# Patient Record
Sex: MALE | Race: WHITE | Employment: OTHER | URBAN - METROPOLITAN AREA
[De-identification: names, ages, dates, MRNs, and addresses within clinical notes are randomized per-mention and may not be internally consistent; named-entity substitution may affect disease eponyms.]

---

## 2018-09-12 ENCOUNTER — OFFICE VISIT (OUTPATIENT)
Dept: ORTHOPEDIC SURGERY | Age: 68
End: 2018-09-12

## 2018-09-12 VITALS — HEIGHT: 70 IN | WEIGHT: 201 LBS | BODY MASS INDEX: 28.77 KG/M2

## 2018-09-12 DIAGNOSIS — M70.61 TROCHANTERIC BURSITIS OF RIGHT HIP: ICD-10-CM

## 2018-09-12 DIAGNOSIS — M25.551 RIGHT HIP PAIN: Primary | ICD-10-CM

## 2018-09-12 DIAGNOSIS — M25.561 RIGHT KNEE PAIN, UNSPECIFIED CHRONICITY: ICD-10-CM

## 2018-09-12 DIAGNOSIS — M76.31 ILIOTIBIAL BAND SYNDROME OF RIGHT SIDE: ICD-10-CM

## 2018-09-12 DIAGNOSIS — M22.2X1 PATELLOFEMORAL PAIN SYNDROME OF RIGHT KNEE: ICD-10-CM

## 2018-09-12 PROCEDURE — 99214 OFFICE O/P EST MOD 30 MIN: CPT | Performed by: ORTHOPAEDIC SURGERY

## 2018-09-12 RX ORDER — CYCLOBENZAPRINE HCL 5 MG
5 TABLET ORAL NIGHTLY PRN
Qty: 30 TABLET | Refills: 0 | Status: ON HOLD | OUTPATIENT
Start: 2018-09-12 | End: 2020-10-13 | Stop reason: ALTCHOICE

## 2018-09-12 RX ORDER — NEBIVOLOL 5 MG/1
TABLET ORAL
COMMUNITY
Start: 2018-05-23

## 2018-09-12 RX ORDER — TRAZODONE HYDROCHLORIDE 50 MG/1
TABLET ORAL
COMMUNITY
Start: 2018-04-20

## 2018-09-12 NOTE — PROGRESS NOTES
CHIEF COMPLAINT:    Chief Complaint   Patient presents with    Hip Pain     RIGHT HIP PAIN. [address] BACK IN 2010. PAIN STARTED ABOUT 1 MONTH AGO, REALLY INCREASED OVER THE PAST 2 WEEKS. CAN'T SLEEP AT NIGHT NO INJURY    Knee Pain     RIGHT KNEE PAIN. CAN'T TAKE NSAIDS       HISTORY OF PRESENT ILLNESS:                The patient is a 79 y.o. male who presents to clinic for evaluation of right hip and knee pain. He has a history of a [place] hip resurfacing performed in 2010. He did extremely well postoperatively however, over the past couple of weeks he has noticed increased pain in the lateral aspect of the hip. He states his pain travels down the lateral thigh and into the knee. He reports some pain along the anterior aspect of the knee as well. He works as a . He does admit to some catching along the lateral aspect of the hip which is intermittent. He also admits to some sleep disturbances.     Past Medical History:   Diagnosis Date    Colon polyps     Hyperlipidemia     Hypertension     Sarcoidosis           The pain assessment was noted & is as follows:  Pain Assessment  Location of Pain: Pelvis  Location Modifiers: Right  Severity of Pain: 8  Quality of Pain: Aching, Dull, Throbbing  Duration of Pain: Persistent  Frequency of Pain: Intermittent  Aggravating Factors: Stretching, Bending, Walking, Other (Comment) (SLEEPING)  Limiting Behavior: Some  Relieving Factors: Rest  Result of Injury: No  Work-Related Injury: No  Are there other pain locations you wish to document?: No]      Work Status/Functionality:     Past Medical History: Medical history form was reviewed today & can be found in the media tab  Past Medical History:   Diagnosis Date    Colon polyps     Hyperlipidemia     Hypertension     Sarcoidosis       Past Surgical History:     Past Surgical History:   Procedure Laterality Date    BRONCHOSCOPY      COLONOSCOPY  06/12/98    Tunular adenoma & Lymphocytic colitis    COLONOSCOPY  08/04/00    Adenoma    COLONOSCOPY  01/11/02    Hemorrhoids    COLONOSCOPY  01/28/05 12/08/07 11/09/10    No polyps    COLONOSCOPY  10/6/2015    HERNIA REPAIR Right 1990    inguinal     HIP SURGERY Right 12/28/10     [place] HIP RESURFACING W/ CELL SAVER & PLATELET GEL    UPPER GASTROINTESTINAL ENDOSCOPY  01/114/02 12/18/07     Current Medications:     Current Outpatient Prescriptions:     nebivolol (BYSTOLIC) 5 MG tablet, TAKE 1 TABLET BY MOUTH ONE TIME A DAY , Disp: , Rfl:     traZODone (DESYREL) 50 MG tablet, TAKE 1 TABLET BY MOUTH nightly , Disp: , Rfl:     omeprazole (PRILOSEC) 10 MG capsule, Take 10 mg by mouth daily, Disp: , Rfl:     hydrochlorothiazide (HYDRODIURIL) 25 MG tablet, Take 25 mg by mouth daily. , Disp: , Rfl:     lisinopril (PRINIVIL;ZESTRIL) 20 MG tablet, Take 40 mg by mouth daily , Disp: , Rfl:     rosuvastatin (CRESTOR) 5 MG tablet, Take 5 mg by mouth daily. , Disp: , Rfl:   Allergies:  Percocet [oxycodone-acetaminophen]  Social History:    reports that he has never smoked. He has never used smokeless tobacco. He reports that he does not drink alcohol or use drugs. Family History:   Family History   Problem Relation Age of Onset    High Blood Pressure Father     Heart Disease Paternal Aunt         aneursym    Heart Disease Paternal Uncle     Diabetes Paternal Uncle     Heart Disease Paternal Grandmother     Cancer Paternal Uncle 79    Emphysema Brother     Diabetes Maternal Uncle     Asthma Neg Hx     Heart Failure Neg Hx        REVIEW OF SYSTEMS:   For new problems, a full review of systems will be found scanned in the patient's chart. CONSTITUTIONAL: Denies unexplained weight loss, fevers, chills   NEUROLOGICAL: Denies unsteady gait or progressive weakness  SKIN: Denies skin changes, delayed healing, rash, itching       PHYSICAL EXAM:    Vitals: Height 5' 10\" (1.778 m), weight 201 lb (91.2 kg).     GENERAL EXAM:  · General Apparence: Patient is adequately Treatment Plan:     1. Right hip history of [place] hip resurfacing    2. Right hip trochanteric bursitis    3. Right-sided iliotibial band syndrome    4. Right knee patellofemoral syndrome/osteoarthritis      I discussed the patient that I think his primary issue is trochanteric bursitis/iliotibial band tightness. I discussed with him treatment options including cortisone injections, physical therapy, activity modification or possibly muscle relaxers do help with his sleep. Unfortunately, the patient is unable to take nonsteroidal anti-inflammatory medications secondary to gastric ulcers. He was given a cortisone injection to the right greater trochanter today as well as a referral to physical therapy. I discussed in detail the risks, benefits, and complications of an injection which include but are not limited to infection, skin reactions, hot swollen joints, and anaphylaxis with the patient. The patient verbalized good understanding and gave informed consent for the injection. The skin was prepped using sterile alcohol. A sterile 22-gauge needle was inserted into the area of maximal tenderness over the greater trochanter and a mixture of 4 mL of 2% Carbocaine, 4 mL of 0.25% Marcaine, and 80 mg of Depo-Medrol was injected under sterile technique. The needle was withdrawn and the puncture site sealed with a Band-Aid. Technique: Under sterile conditions a Son[employer] ultrasound unit with a variable frequency (6.0-15.0 MHz) linear transducer was used to localize the placement of a 22-gauge needle into the area of maximal tenderness over the right greater trochanter. Findings: Successful needle placement for Hip injection. Final images were taken and saved for permanent record. The patient tolerated the injection well. The patient was instructed to call the office immediately if there is any pain, redness, warmth, fever, or chills.

## 2018-09-24 ENCOUNTER — HOSPITAL ENCOUNTER (OUTPATIENT)
Dept: PHYSICAL THERAPY | Age: 68
Setting detail: THERAPIES SERIES
Discharge: HOME | End: 2018-09-24
Payer: COMMERCIAL

## 2018-09-24 PROCEDURE — 97161 PT EVAL LOW COMPLEX 20 MIN: CPT

## 2018-09-24 PROCEDURE — 97140 MANUAL THERAPY 1/> REGIONS: CPT

## 2018-09-24 PROCEDURE — G8978 MOBILITY CURRENT STATUS: HCPCS

## 2018-09-24 PROCEDURE — 97110 THERAPEUTIC EXERCISES: CPT

## 2018-09-24 PROCEDURE — G0283 ELEC STIM OTHER THAN WOUND: HCPCS

## 2018-09-24 PROCEDURE — G8979 MOBILITY GOAL STATUS: HCPCS

## 2018-09-24 NOTE — PLAN OF CARE
3   Hip ER 4- 4-   Knee EXT (quad) 4+ 4   Knee Flex (HS) 4+ 4+   Ankle DF     Ankle PF     Ankle Inv     Ankle EV          Circumference  Mid apex  7 cm prox     35 cm   35 cm     Reflexes/Sensation:   [x]Dermatomes/Myotomes intact    [x]Reflexes equal and normal bilaterally   []Other:    Joint mobility:    []Normal    [x]Hypo: bilat hips and right knee   []Hyper    Palpation: TTP  Bilat greater trochanters, Distal IT Band insertion right knee, and superior-lateral patella border. Functional Mobility/Transfers: indep    Posture: Forward and mild right lateral shift of trunk. Flattened Lumbar Lordosis. Bandages/Dressings/Incisions:     Gait:No AD. Patient wears right patellar knee sleeve. Slight Trendelenburg. Single Leg Stance: right= 12 sec and left=20 sec. Stairs: ascend reciprocal, descend slow w/ side step or step-to    Orthopedic Special Tests: +Sanju, +Obers, + Joe's on left, Poor gluteal activation bilat. - P/F grind, Good PatellarTracking, - McMurrays, - Lachman's. [x] Patient history, allergies, meds reviewed. Medical chart reviewed. See intake form. Review Of Systems (ROS):  [x]Performed Review of systems (Integumentary, CardioPulmonary, Neurological) by intake and observation. Intake form has been scanned into medical record. Patient has been instructed to contact their primary care physician regarding ROS issues if not already being addressed at this time.       Co-morbidities/Complexities (which will affect course of rehabilitation):   []None           Arthritic conditions   []Rheumatoid arthritis (M05.9)  []Osteoarthritis (M19.91)   Cardiovascular conditions   [x]Hypertension (I10)  [x]Hyperlipidemia (E78.5)  []Angina pectoris (I20)  []Atherosclerosis (I70)   Musculoskeletal conditions   []Disc pathology   []Congenital spine pathologies   [x]Prior surgical intervention  []Osteoporosis (M81.8)  []Osteopenia (M85.8)   Endocrine conditions

## 2018-09-24 NOTE — FLOWSHEET NOTE
related to improving balance, coordination, kinesthetic sense, posture, motor skill, proprioception of core, proximal hip and LE for self care, mobility, lifting, and ambulation/stair navigation      Manual Treatments:  PROM / STM / Oscillations-Mobs:  G-I, II, III, IV (PA's, Inf., Post.)  [] (68814) Provided manual therapy to mobilize LE, proximal hip and/or LS spine soft tissue/joints for the purpose of modulating pain, promoting relaxation,  increasing ROM, reducing/eliminating soft tissue swelling/inflammation/restriction, improving soft tissue extensibility and allowing for proper ROM for normal function with self care, mobility, lifting and ambulation. Modalities:  PM ES/CP to bilateral lateral hip     Charges:  Timed Code Treatment Minutes: 35   Total Treatment Minutes: 70     [x] EVAL (LOW) 64272 (typically 20 minutes face-to-face)  [] EVAL (MOD) 20434 (typically 30 minutes face-to-face)  [] EVAL (HIGH) 01376 (typically 45 minutes face-to-face)  [] RE-EVAL     [x] FH(59933) x  1   [] IONTO  [] NMR (79526) x      [] VASO  [x] Manual (49000) x  1    [x] Other:Cp  [] TA x       [] Mech Traction (75464)  [] ES(attended) (85526)      [x] ES (un) (26056):     GOALS:   Short Term Goals: To be achieved in: 2 weeks  1. Independent in HEP and progression per patient tolerance, in order to prevent re-injury. 2. Patient will have a decrease in pain to facilitate improvement in movement, function, and ADLs as indicated by Functional Deficits. Long Term Goals: To be achieved in: 4-6weeks  1. Disability index score of 30% or less for the LEFS to assist with reaching prior level of function. 2. Patient will demonstrate increased AROM to 0-130 degrees   Right knee and +10 degrees Right Hip Extension to allow for proper joint functioning as indicated by patients Functional Deficits.    3. Patient will demonstrate an increase in Strength to good proximal hip strength and control, within 5lb HHD in LE to allow for

## 2018-09-27 ENCOUNTER — HOSPITAL ENCOUNTER (OUTPATIENT)
Dept: PHYSICAL THERAPY | Age: 68
Setting detail: THERAPIES SERIES
Discharge: HOME | End: 2018-09-27
Payer: COMMERCIAL

## 2018-09-27 PROCEDURE — 97110 THERAPEUTIC EXERCISES: CPT | Performed by: PHYSICAL THERAPIST

## 2018-09-27 PROCEDURE — G0283 ELEC STIM OTHER THAN WOUND: HCPCS | Performed by: PHYSICAL THERAPIST

## 2018-09-27 PROCEDURE — 97140 MANUAL THERAPY 1/> REGIONS: CPT | Performed by: PHYSICAL THERAPIST

## 2018-09-27 NOTE — FLOWSHEET NOTE
[facility], [address] [place] [address] Maciej  Phone: [phone]  Fax [phone]    Physical Therapy Daily Treatment Note  Date:  2018    Patient Name:  Yobany Trivedi    :  1950  MRN: [id number]  Restrictions/Precautions:    Medical/Treatment Diagnosis Information:  · Diagnosis: M25.561 Right Knee Pain,  M76.31 Right IT Band Syndrome, M70.61 Right Hip Trochanteric Bursitis, M22.2X1 Right Knee Patello-femoral Syndrome  · Treatment Diagnosis: M25.561 Right Knee Pain,  M25.551 Right Knee Pain,  M25.561 Right Hip Stiffness,  M62.81 Right LE and Core Weakness, R26.2 Difficulty Walking  Insurance/Certification information:  PT Insurance Information:  [employer] (20PT hard limit, No Auth)  Physician Information:  Referring Practitioner: Dr. Dasilva Shaheed of care signed (Y/N): 2018    Date of Patient follow up with Physician:     G-Code (if applicable):      Date G-Code Applied:         Progress Note: []  Yes  [x]  No  Next due by: Visit #10       Latex Allergy:  [x]NO      []YES  Preferred Language for Healthcare:   [x]English       []other:    Visit # Insurance Allowable Requires auth   2 20 hard limit    [x]no        []yes:       Pain level:   5/10  bilat hips (R>L) & R knee     SUBJECTIVE: pt reports his leg is feeling pretty achy today from outside of hip to knee.  Pt reports compliance with HEP     OBJECTIVE:   Observation:   Test measurements:      RESTRICTIONS/PRECAUTIONS: Right hip cortisone injection 2018  [place] Right Hip Resurfacing ,  Right Hernia Repair     Exercises/Interventions:     Therapeutic Ex Sets/sec Reps Notes   Bike 5 min     Gastroc Stretch on Incline H30x4     Prone quad stretch  10\"x10      Supine Psoas Release w/ Knee flexion Stretch H15x5B  hep   Bridge w/ Hip Abd Blue TB H5 2x10 With AZ  hep   Sidestepping RTB @ Ankle 2 laps  hep   Supine IT Band Stretch H15 x5 B  hep   See ATC sheet NV    Supine bridge with SB  5\" 2x10      SL clams  3\" 2x10      Standing mini squat  5\" 2x10      Manual Intervention      Rolling, STM followed by manual stretching IT Band, Gluteal, HF, Quad 15 min                                   NMR re-education      Dx Review and knee/hip PPP Precautions & Care instructions using anatomy illustrations and modles      Tandem on dyno disc  10\"x5                                  Therapeutic Exercise and NMR EXR  [x] (12026) Provided verbal/tactile cueing for activities related to strengthening, flexibility, endurance, ROM for improvements in LE, proximal hip, and core control with self care, mobility, lifting, ambulation. [x] (16963) Provided verbal/tactile cueing for activities related to improving balance, coordination, kinesthetic sense, posture, motor skill, proprioception  to assist with LE, proximal hip, and core control in self care, mobility, lifting, ambulation and eccentric single leg control.      NMR and Therapeutic Activities:    [] (44597 or 06725) Provided verbal/tactile cueing for activities related to improving balance, coordination, kinesthetic sense, posture, motor skill, proprioception and motor activation to allow for proper function of core, proximal hip and LE with self care and ADLs  [x] (72586) Gait Re-education- Provided training and instruction to the patient for proper LE, core and proximal hip recruitment and positioning and eccentric body weight control with ambulation re-education including up and down stairs     Home Exercise Program:    [x] (93649) Reviewed/Progressed HEP activities related to strengthening, flexibility, endurance, ROM of core, proximal hip and LE for functional self-care, mobility, lifting and ambulation/stair navigation   [] (81825)Reviewed/Progressed HEP activities related to improving balance, coordination, kinesthetic sense, posture, motor skill, proprioception of core, proximal hip and LE for self care, mobility, lifting,

## 2018-10-01 ENCOUNTER — HOSPITAL ENCOUNTER (OUTPATIENT)
Dept: PHYSICAL THERAPY | Age: 68
Setting detail: THERAPIES SERIES
Discharge: HOME | End: 2018-10-01
Payer: COMMERCIAL

## 2018-10-01 NOTE — BRIEF OP NOTE
[facility], [address], [address] [place] [address]      Physical Therapy  Cancellation/No-show Note  Patient Name:  Lorena Butler  :  1950   Date:  10/1/2018  Cancelled visits to date: 1  No-shows to date: 0    For today's appointment patient:  [x]  Cancelled  []  Rescheduled appointment  []  No-show     Reason given by patient:  []  Patient ill  []  Conflicting appointment  []  No transportation    []  Conflict with work  [x]  No reason given  []  Other:     Comments:      Electronically signed by:  Britta Spear, [address]

## 2018-10-04 ENCOUNTER — HOSPITAL ENCOUNTER (OUTPATIENT)
Dept: PHYSICAL THERAPY | Age: 68
Setting detail: THERAPIES SERIES
Discharge: HOME | End: 2018-10-04
Payer: COMMERCIAL

## 2020-07-01 ENCOUNTER — OFFICE VISIT (OUTPATIENT)
Dept: ORTHOPEDIC SURGERY | Age: 70
End: 2020-07-01
Payer: MEDICARE

## 2020-07-01 VITALS — HEIGHT: 70 IN | BODY MASS INDEX: 28.78 KG/M2 | WEIGHT: 201.06 LBS

## 2020-07-01 PROCEDURE — 99214 OFFICE O/P EST MOD 30 MIN: CPT | Performed by: PHYSICIAN ASSISTANT

## 2020-07-01 PROCEDURE — 1036F TOBACCO NON-USER: CPT | Performed by: PHYSICIAN ASSISTANT

## 2020-07-01 PROCEDURE — 1123F ACP DISCUSS/DSCN MKR DOCD: CPT | Performed by: PHYSICIAN ASSISTANT

## 2020-07-01 PROCEDURE — 4040F PNEUMOC VAC/ADMIN/RCVD: CPT | Performed by: PHYSICIAN ASSISTANT

## 2020-07-01 PROCEDURE — G8417 CALC BMI ABV UP PARAM F/U: HCPCS | Performed by: PHYSICIAN ASSISTANT

## 2020-07-01 PROCEDURE — G8427 DOCREV CUR MEDS BY ELIG CLIN: HCPCS | Performed by: PHYSICIAN ASSISTANT

## 2020-07-01 PROCEDURE — 3017F COLORECTAL CA SCREEN DOC REV: CPT | Performed by: PHYSICIAN ASSISTANT

## 2020-07-01 RX ORDER — METHYLPREDNISOLONE 4 MG/1
TABLET ORAL
Qty: 1 KIT | Refills: 0 | Status: SHIPPED | OUTPATIENT
Start: 2020-07-01 | End: 2020-08-26

## 2020-07-01 NOTE — PROGRESS NOTES
CHIEF COMPLAINT:    Chief Complaint   Patient presents with    Back Pain     LUMBAR PAIN & PAIN DOWN RT LEG. NO INJURY       HISTORY OF PRESENT ILLNESS:                The patient is a 78 y.o. male who presents to clinic for evaluation of low back and right leg pain. He states he has had some pain and tingling down the right leg for quite some time. He does also notices intermittently on the left-sided but not as much. He has been seen and evaluated by his primary care physician who evaluated him for diabetic neuropathy. he states he did not have neuropathy.     Past Medical History:   Diagnosis Date    Colon polyps     Hyperlipidemia     Hypertension     Sarcoidosis           The pain assessment was noted & is as follows:  Pain Assessment  Location of Pain: Back  Location Modifiers: Right  Severity of Pain: 4  Quality of Pain: Sharp, Dull, Aching  Duration of Pain: Persistent  Frequency of Pain: Intermittent  Aggravating Factors: Bending, Straightening, Stretching  Limiting Behavior: Some  Result of Injury: No  Work-Related Injury: No  Are there other pain locations you wish to document?: No]      Work Status/Functionality:     Past Medical History: Medical history form was reviewed today & can be found in the media tab  Past Medical History:   Diagnosis Date    Colon polyps     Hyperlipidemia     Hypertension     Sarcoidosis       Past Surgical History:     Past Surgical History:   Procedure Laterality Date    BRONCHOSCOPY      COLONOSCOPY  06/12/98    Tunular adenoma & Lymphocytic colitis    COLONOSCOPY  08/04/00    Adenoma    COLONOSCOPY  01/11/02    Hemorrhoids    COLONOSCOPY  01/28/05 12/08/07 11/09/10    No polyps    COLONOSCOPY  10/6/2015    HERNIA REPAIR Right 1990    inguinal     HIP SURGERY Right 12/28/10     [place] HIP RESURFACING W/ CELL SAVER & PLATELET GEL    UPPER GASTROINTESTINAL ENDOSCOPY  01/114/02 12/18/07     Current Medications:     Current Outpatient Medications:    methylPREDNISolone (MEDROL, FERNANDO,) 4 MG tablet, TAKE BY MOUTH AS DIRECTED ON PACKAGE INSERT, Disp: 1 kit, Rfl: 0    nebivolol (BYSTOLIC) 5 MG tablet, TAKE 1 TABLET BY MOUTH ONE TIME A DAY , Disp: , Rfl:     traZODone (DESYREL) 50 MG tablet, TAKE 1 TABLET BY MOUTH nightly , Disp: , Rfl:     cyclobenzaprine (FLEXERIL) 5 MG tablet, Take 1 tablet by mouth nightly as needed for Muscle spasms, Disp: 30 tablet, Rfl: 0    omeprazole (PRILOSEC) 10 MG capsule, Take 10 mg by mouth daily, Disp: , Rfl:     hydrochlorothiazide (HYDRODIURIL) 25 MG tablet, Take 25 mg by mouth daily. , Disp: , Rfl:     lisinopril (PRINIVIL;ZESTRIL) 20 MG tablet, Take 40 mg by mouth daily , Disp: , Rfl:     rosuvastatin (CRESTOR) 5 MG tablet, Take 5 mg by mouth daily. , Disp: , Rfl:   Allergies:  Percocet [oxycodone-acetaminophen]  Social History:    reports that he has never smoked. He has never used smokeless tobacco. He reports that he does not drink alcohol or use drugs. Family History:   Family History   Problem Relation Age of Onset    High Blood Pressure Father     Heart Disease Paternal Aunt         aneursym    Heart Disease Paternal Uncle     Diabetes Paternal Uncle     Heart Disease Paternal Grandmother     Cancer Paternal Uncle 79    Emphysema Brother     Diabetes Maternal Uncle     Asthma Neg Hx     Heart Failure Neg Hx        REVIEW OF SYSTEMS:   For new problems, a full review of systems will be found scanned in the patient's chart. CONSTITUTIONAL: Denies unexplained weight loss, fevers, chills   NEUROLOGICAL: Denies unsteady gait or progressive weakness  SKIN: Denies skin changes, delayed healing, rash, itching       PHYSICAL EXAM:    Vitals: Height 5' 10\" (1.778 m), weight 201 lb 1 oz (91.2 kg). GENERAL EXAM:  · General Apparence: Patient is adequately groomed with no evidence of malnutrition. · Orientation: The patient is oriented to time, place and person.    · Mood & Affect:The patient's mood and affect are appropriate       Lumbar spine PHYSICAL EXAMINATION:  · Inspection: No visible deformity. No significant edema, erythema or ecchymosis. · Palpation: No distinct tenderness to palpation at the hip. Mild sciatic tenderness. · Range of Motion: Motion of the hip is not limited to the right or LEFT    · Strength: No gross strength deficits are noted    · Special Tests: Vascular exam is intact distally          · Skin:  There are no rashes, ulcerations or lesions. · There are no dysvascular changes     Gait & station: Mildly antalgic      Additional Examinations:        Left Lower Extremity: Examination of the left lower extremity does not show any tenderness, deformity or injury. Range of motion is unremarkable. There is no gross instability. There are no rashes, ulcerations or lesions. Strength and tone are normal.      Diagnostic Testing: The following x rays were read and interpreted by myself      2 x-ray views of the lumbar spine demonstrates a [place] hip replacement on the right side. Relatively severe left-sided hip arthritis as well. The lumbar spine demonstrates multilevel degenerative disc disease with large osteophyte formation literally from T12 all the way down to S1. Is moderately severe and multifocal.    Orders     Orders Placed This Encounter   Procedures    XR LUMBAR SPINE (2-3 VIEWS)     Standing Status:   Future     Number of Occurrences:   1     Standing Expiration Date:   7/1/2021     Order Specific Question:   Reason for exam:     Answer:   PAIN         Assessment / Treatment Plan:     1. Moderately severe lumbar spondylosis. We discussed treatment options at length. 2.  Right-sided sciatica    Ayana with the patient that his symptoms really seem to be more lumbar in nature. He was prescribed a Medrol Dosepak and provided with some home physical therapy exercises today.   He will return to the clinic to see 1 of our spine specialist if his symptoms persist.    I spent 25 minutes face-to-face with the patient and greater than 50% that time was spent counseling/coordinating care for the above stated diagnosis and treatment. I have personally performed and/or participated in the history, exam and medical decision making and agree with all pertinent clinical information. I have also reviewed and agree with the past medical, family and social history unless otherwise noted. This dictation was performed with a verbal recognition program (DRAGON) and it was checked for errors. It is possible that there are still dictated errors within this office note. If so, please bring any errors to my attention for an addendum. All efforts were made to ensure that this office note is accurate.

## 2020-08-17 ENCOUNTER — OFFICE VISIT (OUTPATIENT)
Dept: ORTHOPEDIC SURGERY | Age: 70
End: 2020-08-17
Payer: MEDICARE

## 2020-08-17 VITALS — HEIGHT: 70 IN | WEIGHT: 201 LBS | BODY MASS INDEX: 28.77 KG/M2

## 2020-08-17 PROCEDURE — G8417 CALC BMI ABV UP PARAM F/U: HCPCS | Performed by: PHYSICAL MEDICINE & REHABILITATION

## 2020-08-17 PROCEDURE — 1123F ACP DISCUSS/DSCN MKR DOCD: CPT | Performed by: PHYSICAL MEDICINE & REHABILITATION

## 2020-08-17 PROCEDURE — 1036F TOBACCO NON-USER: CPT | Performed by: PHYSICAL MEDICINE & REHABILITATION

## 2020-08-17 PROCEDURE — 99203 OFFICE O/P NEW LOW 30 MIN: CPT | Performed by: PHYSICAL MEDICINE & REHABILITATION

## 2020-08-17 PROCEDURE — 3017F COLORECTAL CA SCREEN DOC REV: CPT | Performed by: PHYSICAL MEDICINE & REHABILITATION

## 2020-08-17 PROCEDURE — 4040F PNEUMOC VAC/ADMIN/RCVD: CPT | Performed by: PHYSICAL MEDICINE & REHABILITATION

## 2020-08-17 PROCEDURE — G8427 DOCREV CUR MEDS BY ELIG CLIN: HCPCS | Performed by: PHYSICAL MEDICINE & REHABILITATION

## 2020-08-17 RX ORDER — PREDNISONE 10 MG/1
TABLET ORAL
Qty: 26 TABLET | Refills: 0 | Status: SHIPPED | OUTPATIENT
Start: 2020-08-17 | End: 2020-08-26

## 2020-08-17 NOTE — PROGRESS NOTES
New Patient: SPINE    CHIEF COMPLAINT:    Chief Complaint   Patient presents with    Back Pain     low back pain going down rt leg, x4mths getting worse, ref from [facility] Gaston, was given steroid that helped but pain is back        HISTORY OF PRESENT ILLNESS:                The patient is a 78 y.o. male seen in consultation by HODA Guzmán for right sciatica    He reports a 4-month history of right sciatica symptoms. He has a history of a right [place] hip by Dr. Reid Trujillo. He has intermittent frequent shooting radiating pain from his right buttock posterior thigh to the right lateral calf. Symptoms are patchy frequent. Symptoms are worsening. He has associated tingling in his foot and weakness in his right knee. Symptoms are worse with sitting leaning forward lying down, better with standing and walking or leaning over on a grocery cart. He has tried home exercises and a Medrol Dosepak with short-term relief but return of symptoms    Past Medical History:   Diagnosis Date    Colon polyps     Hyperlipidemia     Hypertension     Sarcoidosis           Pain Assessment  Location of Pain: Back  Severity of Pain: 9  Quality of Pain: Aching  Duration of Pain: Persistent  Frequency of Pain: Constant  Aggravating Factors: Standing, Walking, Other (Comment)  Limiting Behavior: Yes  Relieving Factors: Rest  Result of Injury: No  Work-Related Injury: No  Are there other pain locations you wish to document?: No    The pain assessment was noted & reviewed in the medical record today.      Current/Past Treatment:   · Physical Therapy: HEP  · Chiropractic:     · Injection:     Medications:            NSAIDS:             Muscle relaxer:              Steriods:   MDP            Neuropathic medications:              Opioids:            Other:   · Surgery/Consult:    Work Status/Functionality:     Past Medical History: Medical history form was reviewed today & scanned into the media tab  Past Medical History: Diagnosis Date    Colon polyps     Hyperlipidemia     Hypertension     Sarcoidosis       Past Surgical History:     Past Surgical History:   Procedure Laterality Date    BRONCHOSCOPY      COLONOSCOPY  06/12/98    Tunular adenoma & Lymphocytic colitis    COLONOSCOPY  08/04/00    Adenoma    COLONOSCOPY  01/11/02    Hemorrhoids    COLONOSCOPY  01/28/05 12/08/07 11/09/10    No polyps    COLONOSCOPY  10/6/2015    HERNIA REPAIR Right 1990    inguinal     HIP SURGERY Right 12/28/10     [place] HIP RESURFACING W/ CELL SAVER & PLATELET GEL    UPPER GASTROINTESTINAL ENDOSCOPY  01/114/02 12/18/07     Current Medications:     Current Outpatient Medications:     methylPREDNISolone (MEDROL, FERNANDO,) 4 MG tablet, TAKE BY MOUTH AS DIRECTED ON PACKAGE INSERT, Disp: 1 kit, Rfl: 0    nebivolol (BYSTOLIC) 5 MG tablet, TAKE 1 TABLET BY MOUTH ONE TIME A DAY , Disp: , Rfl:     traZODone (DESYREL) 50 MG tablet, TAKE 1 TABLET BY MOUTH nightly , Disp: , Rfl:     cyclobenzaprine (FLEXERIL) 5 MG tablet, Take 1 tablet by mouth nightly as needed for Muscle spasms, Disp: 30 tablet, Rfl: 0    omeprazole (PRILOSEC) 10 MG capsule, Take 10 mg by mouth daily, Disp: , Rfl:     hydrochlorothiazide (HYDRODIURIL) 25 MG tablet, Take 25 mg by mouth daily. , Disp: , Rfl:     lisinopril (PRINIVIL;ZESTRIL) 20 MG tablet, Take 40 mg by mouth daily , Disp: , Rfl:     rosuvastatin (CRESTOR) 5 MG tablet, Take 5 mg by mouth daily. , Disp: , Rfl:   Allergies:  Percocet [oxycodone-acetaminophen]  Social History:    reports that he has never smoked. He has never used smokeless tobacco. He reports that he does not drink alcohol or use drugs.   Family History:   Family History   Problem Relation Age of Onset    High Blood Pressure Father     Heart Disease Paternal Aunt         aneursym    Heart Disease Paternal Uncle     Diabetes Paternal Uncle     Heart Disease Paternal Grandmother     Cancer Paternal Uncle 79    Emphysema Brother     Diabetes Maternal Uncle     Asthma Neg Hx     Heart Failure Neg Hx        REVIEW OF SYSTEMS: Full ROS noted & scanned   CONSTITUTIONAL: Denies unexplained weight loss, fevers, chills or fatigue  NEUROLOGICAL: Denies unsteady gait or progressive weakness  MUSCULOSKELETAL: Denies joint swelling or redness  PSYCHOLOGICAL: Denies anxiety, depression   SKIN: Denies skin changes, delayed healing, rash, itching   HEMATOLOGIC: Denies easy bleeding or bruising  ENDOCRINE: Denies excessive thirst, urination, heat/cold  RESPIRATORY: Denies current dyspnea, cough  GI: Denies nausea, vomiting, diarrhea   : Denies bowel or bladder issues       PHYSICAL EXAM:    Vitals: Height 5' 10\" (1.778 m), weight 201 lb (91.2 kg). GENERAL EXAM:  · General Apparence: Patient is adequately groomed with no evidence of malnutrition. · Orientation: The patient is oriented to time, place and person. · Mood & Affect:The patient's mood and affect are appropriate   · Vascular: Examination reveals no swelling tenderness in upper or lower extremities. Good capillary refill  · Lymphatic: The lymphatic examination bilaterally reveals all areas to be without enlargement or induration  · Sensation: Sensation is intact without deficit  · Coordination/Balance: Good coordination       LUMBAR/SACRAL EXAMINATION:  · Inspection: Local inspection shows no step-off or bruising. Lumbar alignment is normal.  Sagittal and Coronal balance is neutral.      · Palpation:   No evidence of tenderness at the midline. No tenderness bilaterally at the paraspinal or trochanters. There is no step-off or paraspinal spasm. · Range of Motion: Moderate loss flexion extension  · Strength:   Strength testing is 5/5 in all muscle groups tested. · Special Tests:   Straight leg raise positive on the right skin: There are no rashes, ulcerations or lesions. · Reflexes: Reflexes are symmetrically S1 at the patellar and +1 ankle tendons.   Clonus absent bilaterally at the feet.  · Gait & station: Normal gait  · Additional Examinations:   · RIGHT LOWER EXTREMITY: Inspection/examination of the right lower extremity does not show any tenderness, deformity or injury. Range of motion is full. There is no gross instability. There are no rashes, ulcerations or lesions. Strength and tone are normal.  ·   · LEFT LOWER EXTREMITY:  Inspection/examination of the left lower extremity does not show any tenderness, deformity or injury. Range of motion is full. There is no gross instability. There are no rashes, ulcerations or lesions.   Strength and tone are normal.    Diagnostic Testing:      [address] hemoglobin A1c 5.5, normal chemistries and CBC    Recent lumbar x-rays have shown spondylosis without acute fracture but are not available as of our PACS system is down    Impression:    4 months worsening right L5 radiculitis        Plan:     Pred taper  MRI lumbar spine  Follow-up after    DIPAK Hernández

## 2020-08-21 ENCOUNTER — HOSPITAL ENCOUNTER (OUTPATIENT)
Dept: MRI IMAGING | Age: 70
Discharge: HOME | End: 2020-08-21
Payer: MEDICARE

## 2020-08-21 PROCEDURE — 72148 MRI LUMBAR SPINE W/O DYE: CPT

## 2020-08-26 ENCOUNTER — OFFICE VISIT (OUTPATIENT)
Dept: ORTHOPEDIC SURGERY | Age: 70
End: 2020-08-26
Payer: MEDICARE

## 2020-08-26 VITALS — WEIGHT: 201 LBS | HEIGHT: 70 IN | BODY MASS INDEX: 28.77 KG/M2

## 2020-08-26 PROCEDURE — 3017F COLORECTAL CA SCREEN DOC REV: CPT | Performed by: PHYSICAL MEDICINE & REHABILITATION

## 2020-08-26 PROCEDURE — 4040F PNEUMOC VAC/ADMIN/RCVD: CPT | Performed by: PHYSICAL MEDICINE & REHABILITATION

## 2020-08-26 PROCEDURE — G8417 CALC BMI ABV UP PARAM F/U: HCPCS | Performed by: PHYSICAL MEDICINE & REHABILITATION

## 2020-08-26 PROCEDURE — G8427 DOCREV CUR MEDS BY ELIG CLIN: HCPCS | Performed by: PHYSICAL MEDICINE & REHABILITATION

## 2020-08-26 PROCEDURE — 99214 OFFICE O/P EST MOD 30 MIN: CPT | Performed by: PHYSICAL MEDICINE & REHABILITATION

## 2020-08-26 PROCEDURE — 1123F ACP DISCUSS/DSCN MKR DOCD: CPT | Performed by: PHYSICAL MEDICINE & REHABILITATION

## 2020-08-26 PROCEDURE — 1036F TOBACCO NON-USER: CPT | Performed by: PHYSICAL MEDICINE & REHABILITATION

## 2020-08-26 NOTE — PROGRESS NOTES
Bar follow-up: SPINE    CHIEF COMPLAINT:    Chief Complaint   Patient presents with    Back Pain     MRI result lumbar spine       HISTORY OF PRESENT ILLNESS:                The patient is a 78 y.o. male seen in consultation by HODA Pappas for right sciatica    He reports a 4.5 -month history of right sciatica symptoms. He has a history of a right [place] hip by Dr. Layla Cooper. He has intermittent frequent shooting radiating pain from his right buttock posterior thigh to the right lateral calf. Symptoms are patchy frequent. Symptoms are worsening. He has associated tingling in his foot and weakness in his right knee. Symptoms are worse with sitting leaning forward lying down, better with standing and walking or leaning over on a grocery cart. He has tried home exercises and a Medrol Dosepak with short-term relief but return of symptoms    He follows up after MRI and prednisone taper. He is doing better. 7 no pain today. He had some discomfort yesterday following a right L5 pattern. But he is having more good days and bad now. He does drive a truck part-time. No new weakness    Pain Assessment  Location of Pain: Back  Severity of Pain: 0]      Past Medical History:   Diagnosis Date    Colon polyps     Hyperlipidemia     Hypertension     Sarcoidosis           Pain Assessment  Location of Pain: Back  Severity of Pain: 0    The pain assessment was noted & reviewed in the medical record today.      Current/Past Treatment:   · Physical Therapy: HEP  · Chiropractic:     · Injection:     Medications:            NSAIDS:             Muscle relaxer:              Steriods:   MDP, Pred taper            Neuropathic medications:              Opioids:            Other:   · Surgery/Consult:    Work Status/Functionality:     Past Medical History: Medical history form was reviewed today & scanned into the media tab  Past Medical History:   Diagnosis Date    Colon polyps     Hyperlipidemia     Hypertension  Sarcoidosis       Past Surgical History:     Past Surgical History:   Procedure Laterality Date    BRONCHOSCOPY      COLONOSCOPY  06/12/98    Tunular adenoma & Lymphocytic colitis    COLONOSCOPY  08/04/00    Adenoma    COLONOSCOPY  01/11/02    Hemorrhoids    COLONOSCOPY  01/28/05 12/08/07 11/09/10    No polyps    COLONOSCOPY  10/6/2015    HERNIA REPAIR Right 1990    inguinal     HIP SURGERY Right 12/28/10     [place] HIP RESURFACING W/ CELL SAVER & PLATELET GEL    UPPER GASTROINTESTINAL ENDOSCOPY  01/114/02 12/18/07     Current Medications:     Current Outpatient Medications:     nebivolol (BYSTOLIC) 5 MG tablet, TAKE 1 TABLET BY MOUTH ONE TIME A DAY , Disp: , Rfl:     traZODone (DESYREL) 50 MG tablet, TAKE 1 TABLET BY MOUTH nightly , Disp: , Rfl:     cyclobenzaprine (FLEXERIL) 5 MG tablet, Take 1 tablet by mouth nightly as needed for Muscle spasms, Disp: 30 tablet, Rfl: 0    omeprazole (PRILOSEC) 10 MG capsule, Take 10 mg by mouth daily, Disp: , Rfl:     hydrochlorothiazide (HYDRODIURIL) 25 MG tablet, Take 25 mg by mouth daily. , Disp: , Rfl:     lisinopril (PRINIVIL;ZESTRIL) 20 MG tablet, Take 40 mg by mouth daily , Disp: , Rfl:     rosuvastatin (CRESTOR) 5 MG tablet, Take 5 mg by mouth daily. , Disp: , Rfl:   Allergies:  Percocet [oxycodone-acetaminophen]  Social History:    reports that he has never smoked. He has never used smokeless tobacco. He reports that he does not drink alcohol or use drugs.   Family History:   Family History   Problem Relation Age of Onset    High Blood Pressure Father     Heart Disease Paternal Aunt         aneursym    Heart Disease Paternal Uncle     Diabetes Paternal Uncle     Heart Disease Paternal Grandmother     Cancer Paternal Uncle 79    Emphysema Brother     Diabetes Maternal Uncle     Asthma Neg Hx     Heart Failure Neg Hx        REVIEW OF SYSTEMS: Full ROS noted & scanned   CONSTITUTIONAL: Denies unexplained weight loss, fevers, chills or fatigue  NEUROLOGICAL: Denies unsteady gait or progressive weakness  MUSCULOSKELETAL: Denies joint swelling or redness  PSYCHOLOGICAL: Denies anxiety, depression   SKIN: Denies skin changes, delayed healing, rash, itching   HEMATOLOGIC: Denies easy bleeding or bruising  ENDOCRINE: Denies excessive thirst, urination, heat/cold  RESPIRATORY: Denies current dyspnea, cough  GI: Denies nausea, vomiting, diarrhea   : Denies bowel or bladder issues       PHYSICAL EXAM:    Vitals: Height 5' 10\" (1.778 m), weight 201 lb (91.2 kg). GENERAL EXAM:  · General Apparence: Patient is adequately groomed with no evidence of malnutrition. · Orientation: The patient is oriented to time, place and person. · Mood & Affect:The patient's mood and affect are appropriate   · Vascular: Examination reveals no swelling tenderness in upper or lower extremities. Good capillary refill  · Lymphatic: The lymphatic examination bilaterally reveals all areas to be without enlargement or induration  · Sensation: Sensation is intact without deficit  · Coordination/Balance: Good coordination       LUMBAR/SACRAL EXAMINATION:  · Inspection: Local inspection shows no step-off or bruising. Lumbar alignment is normal.  Sagittal and Coronal balance is neutral.      · Palpation:   No evidence of tenderness at the midline. No tenderness bilaterally at the paraspinal or trochanters. There is no step-off or paraspinal spasm. · Range of Motion: Moderate loss flexion extension  · Strength:   Strength testing is 5/5 in all muscle groups tested. · Special Tests:   Straight leg raise positive on the right skin: There are no rashes, ulcerations or lesions. · Reflexes: Reflexes are symmetrically S1 at the patellar and +1 ankle tendons. Clonus absent bilaterally at the feet.   · Gait & station: Normal gait  · Additional Examinations:   · RIGHT LOWER EXTREMITY: Inspection/examination of the right lower extremity does not show any tenderness, deformity or

## 2020-09-10 ENCOUNTER — TELEPHONE (OUTPATIENT)
Dept: ORTHOPEDIC SURGERY | Age: 70
End: 2020-09-10

## 2020-09-10 NOTE — TELEPHONE ENCOUNTER
Auth: # NPR    Date: 09/15/2020  Type of SX:  OP  Location: [facility]  CPT: 41636   DX Code: M48.062   M54.16  SX area: RT  L5 - [id number] ANGELA  Insurance: [employer] HPI: Chest Pain


Time Seen by Provider: 03/21/17 07:18


Chief Complaint (Nursing): Chest Pain


Chief Complaint (Provider): Chest Pain


History Per: Patient


History/Exam Limitations: no limitations


Onset/Duration Of Symptoms: Days (few), Intermittent Episodes


Current Symptoms Are (Timing): Still Present


Severity: Mild


Associated Symptoms: denies: Nausea


Additional Complaint(s): 


Patient is a 50 year old male, who has a history of abdominal hernia, presents 

to the ED complaining of lower chest pain for the past few days. Pain is 

intermediate and worse with movement. Denies shortness of breath, headache, leg 

swelling, leg pain, injury, recent travel, nausea, vomiting, or diarrhea.  No 

hormone tx.  No dizziness. 











PMD: Dr. Sylvester Aguilera





Past Medical History


Reviewed: Historical Data, Nursing Documentation, Vital Signs


Vital Signs: 


 Last Vital Signs











Temp  98.5 F   03/21/17 07:08


 


Pulse  76   03/21/17 07:45


 


Resp  18   03/21/17 07:08


 


BP  128/80   03/21/17 07:45


 


Pulse Ox  100   03/21/17 10:11














- Medical History


PMH: Anxiety (Panic attacks), Asthma


   Denies: Diabetes, Hepatitis, HIV, HTN, Seizures, Sexually Transmitted Disease





- Family History


Family History: States: Unknown Family Hx





- Social History


Current smoker - smoking cessation education provided: No


Alcohol: None


Drugs: Denies





- Allergies


Allergies/Adverse Reactions: 


 Allergies











Allergy/AdvReac Type Severity Reaction Status Date / Time


 


No Known Allergies Allergy   Verified 08/27/16 08:09














Review of Systems


ROS Statement: Except As Marked, All Systems Reviewed And Found Negative


Cardiovascular: Positive for: Chest Pain (lower)


Respiratory: Negative for: Shortness of Breath


Gastrointestinal: Negative for: Nausea, Vomiting, Diarrhea


Musculoskeletal: Negative for: Leg Pain


Neurological: Negative for: Headache





Physical Exam





- Reviewed


Nursing Documentation Reviewed: Yes


Vital Signs Reviewed: Yes





- Physical Exam


Appears: Positive for: Non-toxic, No Acute Distress


Head Exam: Positive for: ATRAUMATIC, NORMAL INSPECTION, NORMOCEPHALIC


Skin: Positive for: Normal Color, Warm, DRY


Eye Exam: Positive for: EOMI, Normal appearance, PERRL


ENT: Positive for: Normal ENT Inspection


Neck: Positive for: Normal, Painless ROM, Supple


Cardiovascular/Chest: Positive for: Regular Rate, Rhythm, Chest Non Tender.  

Negative for: Gallop, Murmur


Respiratory: Positive for: Normal Breath Sounds.  Negative for: Accessory 

Muscle Use, Rhonchi, Respiratory Distress


Gastrointestinal/Abdominal: Positive for: Soft, Tenderness (mild epigastric 

tenderness, -non-tender lower abdomen).  Negative for: Mass, Distended, Guarding

, Rebound


Back: Positive for: Normal Inspection.  Negative for: L CVA Tenderness, R CVA 

Tenderness


Extremity: Positive for: Normal ROM


Neurologic/Psych: Positive for: Alert, Oriented





- Laboratory Results


Result Diagrams: 


 03/21/17 08:14





 03/21/17 08:14


Interpretation Of Abn Labs: labs similar to previous





- ECG


ECG: Positive for: Interpreted By Me, Viewed By Me


ECG Rhythm: Positive for: Nonspecific Changes


O2 Sat by Pulse Oximetry: 100 (RA)


Pulse Ox Interpretation: Normal





- Radiology


X-Ray: Interpreted by Me, Viewed By Me


X-Ray Interpretation: No Acute Disease





- Progress


ED Course And Treament: 


Pt. stable.  AAOx3.  Spoke with Dr. Gomez, who is at bedside, who will admit.  

Pt. pcp, Dr. Aguilera, agrees pt. should be admitted and wants service.  Pain 

free.  





Medical Decision Making


Medical Decision Making: 


Time: 7:20





Impression: 51 y/o male with mild tend to the epigastric region. 





Plan:


EKG


CMP


Lipase


Troponin


CBC


PTT/PT


CXR


IVF


Pepcid 20 mg IV








Scribe Attestation:


Documented by Devon King acting as a scribe for Kurt Lyon MD.





MD Scribe Attestation:


All medical record entries made by the Scribe were at my direction and 

personally dictated by me. I have reviewed the chart and agree that the record 

accurately reflects my personal performance of the history, physical exam, 

medical decision making, and the department course for this patient. I have 

also personally directed, reviewed, and agree with the discharge instructions 

and disposition.





Disposition





- Clinical Impression


Clinical Impression: 


 Chest pain





- Patient ED Disposition


Is Patient to be Admitted: Yes


Counseled Patient/Family Regarding: Studies Performed, Diagnosis





- Disposition


Disposition Time: 10:34


Condition: FAIR





- Pt Status Changed To:


Hospital Disposition Of: Observation





- POA


Present On Arrival: None


Core Measure Indicators: Chest Pain

## 2020-09-15 ENCOUNTER — HOSPITAL ENCOUNTER (OUTPATIENT)
Age: 70
Setting detail: OUTPATIENT SURGERY
Discharge: HOME | End: 2020-09-15
Attending: PHYSICAL MEDICINE & REHABILITATION | Admitting: PHYSICAL MEDICINE & REHABILITATION
Payer: MEDICARE

## 2020-09-15 VITALS
OXYGEN SATURATION: 100 % | SYSTOLIC BLOOD PRESSURE: 144 MMHG | WEIGHT: 191 LBS | BODY MASS INDEX: 27.35 KG/M2 | DIASTOLIC BLOOD PRESSURE: 85 MMHG | HEART RATE: 62 BPM | TEMPERATURE: 97.2 F | RESPIRATION RATE: 12 BRPM | HEIGHT: 70 IN

## 2020-09-15 PROCEDURE — 6360000002 HC RX W HCPCS: Performed by: PHYSICAL MEDICINE & REHABILITATION

## 2020-09-15 PROCEDURE — 2500000003 HC RX 250 WO HCPCS: Performed by: PHYSICAL MEDICINE & REHABILITATION

## 2020-09-15 PROCEDURE — 7100000010 HC PHASE II RECOVERY - FIRST 15 MIN: Performed by: PHYSICAL MEDICINE & REHABILITATION

## 2020-09-15 PROCEDURE — 3600000002 HC SURGERY LEVEL 2 BASE: Performed by: PHYSICAL MEDICINE & REHABILITATION

## 2020-09-15 PROCEDURE — 2709999900 HC NON-CHARGEABLE SUPPLY: Performed by: PHYSICAL MEDICINE & REHABILITATION

## 2020-09-15 PROCEDURE — 6360000004 HC RX CONTRAST MEDICATION: Performed by: PHYSICAL MEDICINE & REHABILITATION

## 2020-09-15 RX ORDER — METHYLPREDNISOLONE ACETATE 40 MG/ML
INJECTION, SUSPENSION INTRA-ARTICULAR; INTRALESIONAL; INTRAMUSCULAR; SOFT TISSUE
Status: DISCONTINUED
Start: 2020-09-15 | End: 2020-09-15 | Stop reason: HOSPADM

## 2020-09-15 RX ORDER — LIDOCAINE HYDROCHLORIDE 10 MG/ML
INJECTION, SOLUTION EPIDURAL; INFILTRATION; INTRACAUDAL; PERINEURAL PRN
Status: DISCONTINUED | OUTPATIENT
Start: 2020-09-15 | End: 2020-09-15 | Stop reason: ALTCHOICE

## 2020-09-15 ASSESSMENT — PAIN - FUNCTIONAL ASSESSMENT
PAIN_FUNCTIONAL_ASSESSMENT: PREVENTS OR INTERFERES SOME ACTIVE ACTIVITIES AND ADLS
PAIN_FUNCTIONAL_ASSESSMENT: 0-10

## 2020-09-15 NOTE — OP NOTE
Patient:  Sherry Aguilar   Medical Record #:  [id number]   Date:  9/15/2020  Physician:  Yesi Brice M.D. Facility: [facility]     Pre-op diagnosis: Lumbar spondylosis, lumbar radiculitis  Post-op diagnosis:  same  Procedure: Right L5/S1 interlaminar epidural injection #1 with flouroscopic guidance  Anesthesia: Local  Procedure Note:    The patient was admitted through pre-op and written consent was obtained. The patient was advised of the risks and benefits of the procedure, including but not limited to the following: bleeding, pain, infection, temporary paralysis, nerve damage and spinal headache. The patient was given the opportunity to ask questions. There were no contraindications for this procedure. The appropriate area was prepped and draped in a sterile fashion. Landmarks were identified and marked. The skin and soft tissues were anesthetized with 1% lidocaine. A 20G Touhy needle was advanced to the right L5/S1 interlaminar space using fluoroscopic guidance with ideal needle tip placement confirmed by multiple views. Injection of contrast showed epidural flow. There were no signs of intravascular or intrathecal injection. 80 mg depomedrol and 1cc 1% lidocaine were then injected. There were no complications and the patient tolerated the procedure well. The patient was transferred to the recovery area and monitored. Discharge instructions were given. The patient is to contact me for any post-procedure concerns. The patient is to follow up as scheduled.     CELINA: 3cm     Estimated blood loss: none    F Satnam Evangelista MD

## 2020-09-15 NOTE — H&P
HISTORY AND PHYSICAL/PRE-SEDATION ASSESSMENT    Patient:  Inez Carey   :  1950  Medical Record No.:  [id number]   Date:  9/15/2020  Physician:  Raheem Costa M.D. Facility: [facility]     Nursing History and Physical reviewed and agreed upon. Additional findings:    Allergies:  Percocet [oxycodone-acetaminophen]    Home Medications:    Prior to Admission medications    Medication Sig Start Date End Date Taking? Authorizing Provider   nebivolol (BYSTOLIC) 5 MG tablet TAKE 1 TABLET BY MOUTH ONE TIME A DAY  18   Historical Provider, MD   traZODone (DESYREL) 50 MG tablet TAKE 1 TABLET BY MOUTH nightly  18   Historical Provider, MD   cyclobenzaprine (FLEXERIL) 5 MG tablet Take 1 tablet by mouth nightly as needed for Muscle spasms 18   Kay Arana MD   omeprazole (PRILOSEC) 10 MG capsule Take 10 mg by mouth daily    Historical Provider, MD   hydrochlorothiazide (HYDRODIURIL) 25 MG tablet Take 25 mg by mouth daily. Historical Provider, MD   lisinopril (PRINIVIL;ZESTRIL) 20 MG tablet Take 40 mg by mouth daily  11/5/10   Historical Provider, MD   rosuvastatin (CRESTOR) 5 MG tablet Take 5 mg by mouth daily. Historical Provider, MD       Vitals: Stable     PHYSICAL EXAM:  HENT: Airway patent and reviewed  Cardiovascular: Normal rate, regular rhythm, normal heart sounds. Pulmonary/Chest: No wheezes. No rhonchi. No rales. Abdominal: Soft. Bowel sounds are normal. No distension. Mallampati: 2      MALLAMPATI:           []   I. Complete visualization of the soft palate           [x]   II. Complete visualization of the uvula            []   III. Visualization of only the base of the uvula           []   IV. Soft palate is not visible     ASA CLASS:         []   I. Normal, healthy adult           [x]   II.  Mild systemic disease            []   III.   Severe systemic disease      Sedation plan:   [x]  Local              []  Minimal

## 2020-09-29 ENCOUNTER — VIRTUAL VISIT (OUTPATIENT)
Dept: ORTHOPEDIC SURGERY | Age: 70
End: 2020-09-29

## 2020-10-02 ENCOUNTER — OFFICE VISIT (OUTPATIENT)
Dept: ORTHOPEDIC SURGERY | Age: 70
End: 2020-10-02
Payer: MEDICARE

## 2020-10-02 VITALS — HEIGHT: 70 IN | BODY MASS INDEX: 27.35 KG/M2 | WEIGHT: 191 LBS

## 2020-10-02 PROCEDURE — 3017F COLORECTAL CA SCREEN DOC REV: CPT | Performed by: PHYSICAL MEDICINE & REHABILITATION

## 2020-10-02 PROCEDURE — 1123F ACP DISCUSS/DSCN MKR DOCD: CPT | Performed by: PHYSICAL MEDICINE & REHABILITATION

## 2020-10-02 PROCEDURE — G8417 CALC BMI ABV UP PARAM F/U: HCPCS | Performed by: PHYSICAL MEDICINE & REHABILITATION

## 2020-10-02 PROCEDURE — G8484 FLU IMMUNIZE NO ADMIN: HCPCS | Performed by: PHYSICAL MEDICINE & REHABILITATION

## 2020-10-02 PROCEDURE — 1036F TOBACCO NON-USER: CPT | Performed by: PHYSICAL MEDICINE & REHABILITATION

## 2020-10-02 PROCEDURE — 99214 OFFICE O/P EST MOD 30 MIN: CPT | Performed by: PHYSICAL MEDICINE & REHABILITATION

## 2020-10-02 PROCEDURE — 4040F PNEUMOC VAC/ADMIN/RCVD: CPT | Performed by: PHYSICAL MEDICINE & REHABILITATION

## 2020-10-02 PROCEDURE — G8427 DOCREV CUR MEDS BY ELIG CLIN: HCPCS | Performed by: PHYSICAL MEDICINE & REHABILITATION

## 2020-10-02 NOTE — LETTER
[facility]    Please Schedule the following with: Dr. Chance Bailey    Date:  10/2/20     Patient: Carol Carpio     YOB: 1950    Patient Home Phone: [phone] (home)    Diagnosis: Right L5 foraminal stenosis M48.062, L3-4 central stenosis M48.062    []LT     [x]RT     []SHEEBA     []Midline    Levels: Right L5 transforaminal epidural, #2    []Cervical ANGELA 44431, 27877  []L-MBB 82385, 34575  []SI Joint 03936   []C-FACET 86348, 15256, 22133  []L-FACET 25260, 97829  []Interlaminar ANGELA 65710     []HIP 05345    []C-MBB  [x]Transforaminal ANGELA 74532  []Neurotomy 56978, 63180, 32891    Attending Physician: Rolanda Sherman    Injection Schedule for:  10/13/2020 AT 130PM    At: [facility]    First Insurance: MEDICARE                        Pre-cert #: NO AUTH REQ  Second Insurance:                 Pre-cert #:    [id number] Right L5/S1 interlaminar epidural injection #170% improved    SEDATION:       [] IV           [] ORAL    [] Blood Thinner:                 []Diabetic           []Antibiotic:               []Glaucoma:    [] Pacemaker/defib       [] Current Open Wounds, Lacerations or Sores     Allergies: Allergies   Allergen Reactions    Percocet [Oxycodone-Acetaminophen] Nausea And Vomiting       Past Medical History:   Diagnosis Date    Ascending aortic aneurysm ([facility] [address] 2016    Colon polyps     Hyperlipidemia     Hypertension     Sarcoidosis         Current Outpatient Medications   Medication Sig Dispense Refill    nebivolol (BYSTOLIC) 5 MG tablet TAKE 1 TABLET BY MOUTH ONE TIME A DAY       traZODone (DESYREL) 50 MG tablet TAKE 1 TABLET BY MOUTH nightly       cyclobenzaprine (FLEXERIL) 5 MG tablet Take 1 tablet by mouth nightly as needed for Muscle spasms 30 tablet 0    omeprazole (PRILOSEC) 10 MG capsule Take 10 mg by mouth daily      hydrochlorothiazide (HYDRODIURIL) 25 MG tablet Take 25 mg by mouth daily.  lisinopril (PRINIVIL;ZESTRIL) 20 MG tablet Take 40 mg by mouth daily       rosuvastatin (CRESTOR) 5 MG tablet Take 5 mg by mouth daily. No current facility-administered medications for this visit. [address]                     ______________________________________________________________________      [address] [place]      1. Admit to preop. 2. Start IV 1000 ml LR at [facility] or _____ml/hr for planned conscious sedation     3. May inject 1 % Lidocaine 0.1 ml Intradermal to numb IV site     4. Protime/INR if patient is on Coumadin     5. Urine Pregnancy Test (females only) - 12 -50 years     6. Accu Check Glucose if diabetic. Notify physician if <80 or >250.      7. Sedate all neurotomies          ______________________________________________________________________    POST-OPERATIVE ORDERS - DR. MONROY      1. Admit to Post Op Phase 2     2. Implement Standards of Care for Phase 2 Post Op     3. Check Site - May discharge when site is free of bleeding     4. Discharge to home after meets Phase 2 criteria     5. Discharge cervical patients after 30 minutes and when meets Phase 2 criteria. 6. Give discharge instruction sheet     7. For Diabetic patient, if blood sugar less than 80 in preop,          Recheck blood sugar in Post Op. 8. Discontinue IV     9.  For Nausea may give Zofran 4 MG IV/IM/ODT           ______________________________________________________________________    Savanna Bobo     1950        10/2/20 10:28 AM

## 2020-10-02 NOTE — PROGRESS NOTES
Lumbar follow-up: SPINE    CHIEF COMPLAINT:    Chief Complaint   Patient presents with    Back Pain     fu after inj  70% relief       HISTORY OF PRESENT ILLNESS:                The patient is a 78 y.o. male follow-up L3-4 lumbar stenosis right L5 foraminal stenosis 5 months right sciatica. Status post right L5-S1 interlaminar epidural.  70% improved. [place] Stands He still having days of difficulty walking due to pain rating from his buttock posterior thigh lateral calf. Describes as an ache and burning. No associated weakness or bowel bladder changes. No fevers or chills. No contralateral left-sided symptoms    Injection forms reviewed    Pain Assessment  Location of Pain: Back  Severity of Pain: 3  Quality of Pain: Aching  Duration of Pain: Persistent  Frequency of Pain: Intermittent  Aggravating Factors: Standing, Walking, Other (Comment)  Limiting Behavior: Yes  Relieving Factors: Rest  Result of Injury: No  Work-Related Injury: No  Are there other pain locations you wish to document?: No]      Past Medical History:   Diagnosis Date    Ascending aortic aneurysm ([facility] [address] 2016    Colon polyps     Hyperlipidemia     Hypertension     Sarcoidosis           Pain Assessment  Location of Pain: Back  Severity of Pain: 3  Quality of Pain: Aching  Duration of Pain: Persistent  Frequency of Pain: Intermittent  Aggravating Factors: Standing, Walking, Other (Comment)  Limiting Behavior: Yes  Relieving Factors: Rest  Result of Injury: No  Work-Related Injury: No  Are there other pain locations you wish to document?: No    The pain assessment was noted & reviewed in the medical record today.      Current/Past Treatment:   · Physical Therapy: HEP  · Chiropractic:     · Injection:   9/15/2020 Right L5/S1 interlaminar epidural injection #1  Medications:            NSAIDS:             Muscle relaxer:              Steriods:   MDP, Pred taper            Neuropathic medications:              Opioids:            Other: · Surgery/Consult:    Work Status/Functionality:     Past Medical History: Medical history form was reviewed today & scanned into the media tab  Past Medical History:   Diagnosis Date    Ascending aortic aneurysm ([address] 2016    Colon polyps     Hyperlipidemia     Hypertension     Sarcoidosis       Past Surgical History:     Past Surgical History:   Procedure Laterality Date    BRONCHOSCOPY      COLONOSCOPY  06/12/98    Tunular adenoma & Lymphocytic colitis    COLONOSCOPY  08/04/00    Adenoma    COLONOSCOPY  01/11/02    Hemorrhoids    COLONOSCOPY  01/28/05 12/08/07 11/09/10    No polyps    COLONOSCOPY  10/6/2015    HERNIA REPAIR Right 1990    inguinal     HIP SURGERY Right 12/28/10     [place] HIP RESURFACING W/ CELL SAVER & PLATELET GEL    PAIN MANAGEMENT PROCEDURE Right 9/15/2020    RIGHT LUMBAR FIVE SACRAL ONE EPIDURAL STEROID INJECTION SITE CONFIRMED BY FLUOROSCOPY performed by Yas Mario MD at [address]  01/114/02 12/18/07     Current Medications:     Current Outpatient Medications:     nebivolol (BYSTOLIC) 5 MG tablet, TAKE 1 TABLET BY MOUTH ONE TIME A DAY , Disp: , Rfl:     traZODone (DESYREL) 50 MG tablet, TAKE 1 TABLET BY MOUTH nightly , Disp: , Rfl:     cyclobenzaprine (FLEXERIL) 5 MG tablet, Take 1 tablet by mouth nightly as needed for Muscle spasms, Disp: 30 tablet, Rfl: 0    omeprazole (PRILOSEC) 10 MG capsule, Take 10 mg by mouth daily, Disp: , Rfl:     hydrochlorothiazide (HYDRODIURIL) 25 MG tablet, Take 25 mg by mouth daily. , Disp: , Rfl:     lisinopril (PRINIVIL;ZESTRIL) 20 MG tablet, Take 40 mg by mouth daily , Disp: , Rfl:     rosuvastatin (CRESTOR) 5 MG tablet, Take 5 mg by mouth daily. , Disp: , Rfl:   Allergies:  Percocet [oxycodone-acetaminophen]  Social History:    reports that he has never smoked. He has never used smokeless tobacco. He reports that he does not drink alcohol or use drugs.   Family History:   Family History   Problem Relation Age of Onset    High Blood Pressure Father     Heart Disease Paternal Aunt         aneursym    Heart Disease Paternal Uncle     Diabetes Paternal Uncle     Heart Disease Paternal Grandmother     Cancer Paternal Uncle 79    Emphysema Brother     Diabetes Maternal Uncle     Asthma Neg Hx     Heart Failure Neg Hx        REVIEW OF SYSTEMS: Full ROS noted & scanned   CONSTITUTIONAL: Denies unexplained weight loss, fevers, chills or fatigue  NEUROLOGICAL: Denies unsteady gait or progressive weakness  MUSCULOSKELETAL: Denies joint swelling or redness  PSYCHOLOGICAL: Denies anxiety, depression   SKIN: Denies skin changes, delayed healing, rash, itching   HEMATOLOGIC: Denies easy bleeding or bruising  ENDOCRINE: Denies excessive thirst, urination, heat/cold  RESPIRATORY: Denies current dyspnea, cough  GI: Denies nausea, vomiting, diarrhea   : Denies bowel or bladder issues       PHYSICAL EXAM:    Vitals: Height 5' 10\" (1.778 m), weight 191 lb (86.6 kg). GENERAL EXAM:  · General Apparence: Patient is adequately groomed with no evidence of malnutrition. · Orientation: The patient is oriented to time, place and person. · Mood & Affect:The patient's mood and affect are appropriate   · Vascular: Examination reveals no swelling tenderness in upper or lower extremities. Good capillary refill  · Lymphatic: The lymphatic examination bilaterally reveals all areas to be without enlargement or induration  · Sensation: Sensation is intact without deficit  · Coordination/Balance: Good coordination       LUMBAR/SACRAL EXAMINATION:  · Inspection: Local inspection shows no step-off or bruising. Lumbar alignment is normal.  Sagittal and Coronal balance is neutral.      · Palpation:   No evidence of tenderness at the midline. No tenderness bilaterally at the paraspinal or trochanters. There is no step-off or paraspinal spasm.    · Range of Motion: Moderate loss flexion extension  · Strength: Strength testing is 5/5 in all muscle groups tested. · Special Tests:   Straight leg raise positive on the right skin: There are no rashes, ulcerations or lesions. · Reflexes: Reflexes are symmetrically S1 at the patellar and +1 ankle tendons. Clonus absent bilaterally at the feet. · Gait & station: Normal gait  · Additional Examinations:   · RIGHT LOWER EXTREMITY: Inspection/examination of the right lower extremity does not show any tenderness, deformity or injury. Range of motion is full. There is no gross instability. There are no rashes, ulcerations or lesions. Strength and tone are normal.  ·   · LEFT LOWER EXTREMITY:  Inspection/examination of the left lower extremity does not show any tenderness, deformity or injury. Range of motion is full. There is no gross instability. There are no rashes, ulcerations or lesions.   Strength and tone are normal.    Diagnostic Testing:      MRI films and report reviewed showing moderate central stenosis L3-4 and mild to moderate right L5 foraminal stenosis    [address] hemoglobin A1c 5.5, normal chemistries and CBC    Recent lumbar x-rays have shown spondylosis without acute fracture but are not available as of our PACS system is down    Impression:    5 months improving right L5 radiculitis  Right L5 foraminal stenosis, L3-4 central stenosis      Plan:     Focal right L5 transforaminal epidural, #2    F Anne Marie Brown

## 2020-10-05 ENCOUNTER — TELEPHONE (OUTPATIENT)
Dept: ORTHOPEDIC SURGERY | Age: 70
End: 2020-10-05

## 2020-10-05 NOTE — TELEPHONE ENCOUNTER
Auth: # NPR     Date: 10/13/2020  Type of SX:  OP  Location: [facility]  CPT: 27416   DX Code: M48.062  SX area: Rt L5  Transforaminal ANGELA  Insurance: [employer]

## 2020-10-13 ENCOUNTER — HOSPITAL ENCOUNTER (OUTPATIENT)
Age: 70
Setting detail: OUTPATIENT SURGERY
Discharge: HOME | End: 2020-10-13
Attending: PHYSICAL MEDICINE & REHABILITATION | Admitting: PHYSICAL MEDICINE & REHABILITATION
Payer: MEDICARE

## 2020-10-13 VITALS
HEART RATE: 72 BPM | HEIGHT: 70 IN | OXYGEN SATURATION: 100 % | SYSTOLIC BLOOD PRESSURE: 127 MMHG | DIASTOLIC BLOOD PRESSURE: 87 MMHG | TEMPERATURE: 97 F | BODY MASS INDEX: 28.2 KG/M2 | WEIGHT: 197 LBS | RESPIRATION RATE: 20 BRPM

## 2020-10-13 PROCEDURE — 7100000010 HC PHASE II RECOVERY - FIRST 15 MIN: Performed by: PHYSICAL MEDICINE & REHABILITATION

## 2020-10-13 PROCEDURE — 3600000002 HC SURGERY LEVEL 2 BASE: Performed by: PHYSICAL MEDICINE & REHABILITATION

## 2020-10-13 PROCEDURE — 2709999900 HC NON-CHARGEABLE SUPPLY: Performed by: PHYSICAL MEDICINE & REHABILITATION

## 2020-10-13 PROCEDURE — 2500000003 HC RX 250 WO HCPCS: Performed by: PHYSICAL MEDICINE & REHABILITATION

## 2020-10-13 PROCEDURE — 6360000002 HC RX W HCPCS: Performed by: PHYSICAL MEDICINE & REHABILITATION

## 2020-10-13 PROCEDURE — 6360000004 HC RX CONTRAST MEDICATION: Performed by: PHYSICAL MEDICINE & REHABILITATION

## 2020-10-13 RX ORDER — METHYLPREDNISOLONE ACETATE 40 MG/ML
INJECTION, SUSPENSION INTRA-ARTICULAR; INTRALESIONAL; INTRAMUSCULAR; SOFT TISSUE
Status: DISCONTINUED
Start: 2020-10-13 | End: 2020-10-13 | Stop reason: HOSPADM

## 2020-10-13 RX ORDER — LIDOCAINE HYDROCHLORIDE 10 MG/ML
INJECTION, SOLUTION EPIDURAL; INFILTRATION; INTRACAUDAL; PERINEURAL PRN
Status: DISCONTINUED | OUTPATIENT
Start: 2020-10-13 | End: 2020-10-13 | Stop reason: ALTCHOICE

## 2020-10-13 ASSESSMENT — PAIN - FUNCTIONAL ASSESSMENT
PAIN_FUNCTIONAL_ASSESSMENT: 0-10
PAIN_FUNCTIONAL_ASSESSMENT: PREVENTS OR INTERFERES SOME ACTIVE ACTIVITIES AND ADLS

## 2020-10-13 ASSESSMENT — PAIN DESCRIPTION - DESCRIPTORS: DESCRIPTORS: ACHING

## 2020-10-13 NOTE — OP NOTE
Patient:  Dave Queen   Medical Record #:  [id number]   Date:  10/13/2020  Physician:  Cassie Espinoza M.D. Facility: [facility]       Pre-op diagnosis: Lumbar radiculitis, lumbar spondylosis  Post-op diagnosis:  same  Procedure: Right L5-S1 transforaminal epidural injection #2 with flouroscopic guidance     Procedure Note:    The patient was admitted through pre-op and written consent was obtained. The patient was advised of the risks and benefits of the procedure, including but not limited to the following: bleeding, pain, infection, temporary paralysis, nerve damage and spinal headache. The patient was given the opportunity to ask questions. There were no contraindications for this procedure. The appropriate area was prepped and draped in a sterile fashion. Landmarks were identified and marked. A 23G spinal needle was advanced to the right L5 neural foramen using fluoroscopic guidance with ideal needle tip placement confirmed by multiple views. Injection of contrast showed epidural flow. There were no signs of intravascular or intrathecal injection. 80 mg depomedrol and 1cc 1% lidocaine were then injected. There were no complications and the patient tolerated the procedure well. The patient was transferred to the recovery area and monitored. Discharge instructions were given. The patient is to contact me for any post-procedure concerns. The patient is to follow up as scheduled.     Estimated blood loss: none    F Mi Zambrano MD

## 2020-10-13 NOTE — PROGRESS NOTES
Discharge  instructions reviewed. Pt and family verbalize understanding with no further questions. VSS. Pt discharged via [facility] to car. Assessment unchanged.

## 2020-10-13 NOTE — H&P
HISTORY AND PHYSICAL/PRE-SEDATION ASSESSMENT    Patient:  Brett King   :  1950  Medical Record No.:  [id number]   Date:  10/13/2020  Physician:  Mansoor Centeno M.D. Facility: [facility]     Nursing History and Physical reviewed and agreed upon. Additional findings:    Allergies:  Percocet [oxycodone-acetaminophen]    Home Medications:    Prior to Admission medications    Medication Sig Start Date End Date Taking? Authorizing Provider   nebivolol (BYSTOLIC) 5 MG tablet TAKE 1 TABLET BY MOUTH ONE TIME A DAY  18   Historical Provider, MD   traZODone (DESYREL) 50 MG tablet TAKE 1 TABLET BY MOUTH nightly  18   Historical Provider, MD   cyclobenzaprine (FLEXERIL) 5 MG tablet Take 1 tablet by mouth nightly as needed for Muscle spasms 18   Edwardo Soto MD   omeprazole (PRILOSEC) 10 MG capsule Take 10 mg by mouth daily    Historical Provider, MD   hydrochlorothiazide (HYDRODIURIL) 25 MG tablet Take 25 mg by mouth daily. Historical Provider, MD   lisinopril (PRINIVIL;ZESTRIL) 20 MG tablet Take 40 mg by mouth daily  11/5/10   Historical Provider, MD   rosuvastatin (CRESTOR) 5 MG tablet Take 5 mg by mouth daily. Historical Provider, MD       Vitals: Stable     PHYSICAL EXAM:  HENT: Airway patent and reviewed  Cardiovascular: Normal rate, regular rhythm, normal heart sounds. Pulmonary/Chest: No wheezes. No rhonchi. No rales. Abdominal: Soft. Bowel sounds are normal. No distension. Mallampati: 2      MALLAMPATI:           []   I. Complete visualization of the soft palate           [x]   II. Complete visualization of the uvula            []   III. Visualization of only the base of the uvula           []   IV. Soft palate is not visible     ASA CLASS:         []   I. Normal, healthy adult           [x]   II.  Mild systemic disease            []   III.   Severe systemic disease      Sedation plan:   [x]  Local              []  Minimal

## 2020-11-02 ENCOUNTER — OFFICE VISIT (OUTPATIENT)
Dept: ORTHOPEDIC SURGERY | Age: 70
End: 2020-11-02
Payer: MEDICARE

## 2020-11-02 VITALS — HEIGHT: 70 IN | WEIGHT: 197.09 LBS | BODY MASS INDEX: 28.22 KG/M2

## 2020-11-02 PROCEDURE — G8417 CALC BMI ABV UP PARAM F/U: HCPCS | Performed by: PHYSICIAN ASSISTANT

## 2020-11-02 PROCEDURE — 3017F COLORECTAL CA SCREEN DOC REV: CPT | Performed by: PHYSICIAN ASSISTANT

## 2020-11-02 PROCEDURE — 99212 OFFICE O/P EST SF 10 MIN: CPT | Performed by: PHYSICIAN ASSISTANT

## 2020-11-02 PROCEDURE — G8484 FLU IMMUNIZE NO ADMIN: HCPCS | Performed by: PHYSICIAN ASSISTANT

## 2020-11-02 PROCEDURE — 4040F PNEUMOC VAC/ADMIN/RCVD: CPT | Performed by: PHYSICIAN ASSISTANT

## 2020-11-02 PROCEDURE — 1123F ACP DISCUSS/DSCN MKR DOCD: CPT | Performed by: PHYSICIAN ASSISTANT

## 2020-11-02 PROCEDURE — 1036F TOBACCO NON-USER: CPT | Performed by: PHYSICIAN ASSISTANT

## 2020-11-02 PROCEDURE — G8427 DOCREV CUR MEDS BY ELIG CLIN: HCPCS | Performed by: PHYSICIAN ASSISTANT

## 2020-12-21 ENCOUNTER — OFFICE VISIT (OUTPATIENT)
Dept: ORTHOPEDIC SURGERY | Age: 70
End: 2020-12-21
Payer: MEDICARE

## 2020-12-21 VITALS — WEIGHT: 197 LBS | HEIGHT: 70 IN | BODY MASS INDEX: 28.2 KG/M2

## 2020-12-21 PROCEDURE — 99214 OFFICE O/P EST MOD 30 MIN: CPT | Performed by: PHYSICAL MEDICINE & REHABILITATION

## 2020-12-21 PROCEDURE — G8417 CALC BMI ABV UP PARAM F/U: HCPCS | Performed by: PHYSICAL MEDICINE & REHABILITATION

## 2020-12-21 PROCEDURE — 1123F ACP DISCUSS/DSCN MKR DOCD: CPT | Performed by: PHYSICAL MEDICINE & REHABILITATION

## 2020-12-21 PROCEDURE — 4040F PNEUMOC VAC/ADMIN/RCVD: CPT | Performed by: PHYSICAL MEDICINE & REHABILITATION

## 2020-12-21 PROCEDURE — G8427 DOCREV CUR MEDS BY ELIG CLIN: HCPCS | Performed by: PHYSICAL MEDICINE & REHABILITATION

## 2020-12-21 PROCEDURE — G8484 FLU IMMUNIZE NO ADMIN: HCPCS | Performed by: PHYSICAL MEDICINE & REHABILITATION

## 2020-12-21 PROCEDURE — 3017F COLORECTAL CA SCREEN DOC REV: CPT | Performed by: PHYSICAL MEDICINE & REHABILITATION

## 2020-12-21 PROCEDURE — 1036F TOBACCO NON-USER: CPT | Performed by: PHYSICAL MEDICINE & REHABILITATION

## 2020-12-21 NOTE — LETTER
[facility]  Surgery Precert & Billing Form:    DEMOGRAPHICS:                                                                                                       Patient Name:  Rory Shepherd  Patient :  1950   Patient SS#:      Patient Phone:  [phone] (home) [phone] (work) Alt.  Patient Phone:    Patient Address:  [address] [address]    PCP:  Alona Lopes DO  Insurance: MEDICARE    DIAGNOSIS & PROCEDURE:                                                                                      Diagnosis: M48.062  Operation: right L5 TX ANGELA #3    SURGERY  INFORMATION  Date of Surgery:   2020  Location: [facility]  Type:    OUTPATIENT  23 hour hold:  NO  Surgeon:          Brigette Esquivel MD  20     BILLING INFORMATION:                                                                                                Physician Procedure                                            CPT Codes        RIGHT L5 TRANSFORAMINAL ANGELA #3  52539                  PA, or Fellow Procedure                                      CPT Codes
 lisinopril (PRINIVIL;ZESTRIL) 20 MG tablet Take 40 mg by mouth daily       rosuvastatin (CRESTOR) 5 MG tablet Take 5 mg by mouth daily. No current facility-administered medications for this visit. [facility]                     ______________________________________________________________________      [address] [place]      1. Admit to preop. 2. Start IV 1000 ml LR at [facility] or _____ml/hr for planned conscious sedation     3. May inject 1 % Lidocaine 0.1 ml Intradermal to numb IV site     4. Protime/INR if patient is on Coumadin     5. Urine Pregnancy Test (females only) - 12 -50 years     6. Accu Check Glucose if diabetic. Notify physician if <80 or >250.      7. Sedate all neurotomies          ______________________________________________________________________    POST-OPERATIVE ORDERS - DR. MONROY      1. Admit to Post Op Phase 2     2. Implement Standards of Care for Phase 2 Post Op     3. Check Site - May discharge when site is free of bleeding     4. Discharge to home after meets Phase 2 criteria     5. Discharge cervical patients after 30 minutes and when meets Phase 2 criteria. 6. Give discharge instruction sheet     7. For Diabetic patient, if blood sugar less than 80 in preop,          Recheck blood sugar in Post Op. 8. Discontinue IV     9.  For Nausea may give Zofran 4 MG IV/IM/ODT           ______________________________________________________________________    Laya Reyes     1950 12/21/20 4:21 PM

## 2020-12-21 NOTE — PROGRESS NOTES
Lumbar follow-up: SPINE    CHIEF COMPLAINT:    Chief Complaint   Patient presents with    Back Pain     fu back having more pain       HISTORY OF PRESENT ILLNESS:                The patient is a 78 y.o. male follow-up after right L5-S1 TX ANGELA #2 from 10/13/2020 for a 6-months h/o achy right LB/buttock pain radiating down the posterolateral leg. 1 mo relief and now turned right radiating leg pain from his hip thigh calf to the ankle. It is worse when transitioning from sit to stand.   No progressive weakness        Pain Assessment  Location of Pain: Back  Severity of Pain: 4  Quality of Pain: Aching  Duration of Pain: Persistent  Frequency of Pain: Constant  Aggravating Factors: Standing, Other (Comment)  Limiting Behavior: Yes  Relieving Factors: Rest  Result of Injury: No  Work-Related Injury: No  Are there other pain locations you wish to document?: No]     Current/Past Treatment:   · Physical Therapy: HEP  · Chiropractic:     · Injection:   9/15/2020 Right L5/S1 interlaminar epidural injection #1  10/13/2020 Right L5-S1 transforaminal epidural injection #2--85%  Medications:            NSAIDS:             Muscle relaxer:              Steriods:   MDP, Pred taper            Neuropathic medications:              Opioids:            Other:   · Surgery/Consult:    Past Medical History: Medical history form was reviewed today & scanned into the media tab  Past Medical History:   Diagnosis Date    Ascending aortic aneurysm ([facility] [address] 2016    Colon polyps     Hyperlipidemia     Hypertension     Sarcoidosis       Past Surgical History:     Past Surgical History:   Procedure Laterality Date    BRONCHOSCOPY      COLONOSCOPY  06/12/98    Tunular adenoma & Lymphocytic colitis    COLONOSCOPY  08/04/00    Adenoma    COLONOSCOPY  01/11/02    Hemorrhoids    COLONOSCOPY  01/28/05 12/08/07 11/09/10    No polyps    COLONOSCOPY  10/6/2015    HERNIA REPAIR Right 1990    inguinal     HIP SURGERY Right 12/28/10 [place] HIP RESURFACING W/ CELL SAVER & PLATELET GEL    PAIN MANAGEMENT PROCEDURE Right 9/15/2020    RIGHT LUMBAR FIVE SACRAL ONE EPIDURAL STEROID INJECTION SITE CONFIRMED BY FLUOROSCOPY performed by Elian Guadalupe MD at [address] Right 10/13/2020    RIGHT LUMBAR FIVE EPIDURAL STEROID INJECTION SITE CONFIRMED BY FLUOROSCOPY performed by Elian Guadalupe MD at [address]  01/114/02 12/18/07     Current Medications:     Current Outpatient Medications:     nebivolol (BYSTOLIC) 5 MG tablet, TAKE 1 TABLET BY MOUTH ONE TIME A DAY , Disp: , Rfl:     traZODone (DESYREL) 50 MG tablet, TAKE 1 TABLET BY MOUTH nightly , Disp: , Rfl:     omeprazole (PRILOSEC) 10 MG capsule, Take 10 mg by mouth daily, Disp: , Rfl:     hydrochlorothiazide (HYDRODIURIL) 25 MG tablet, Take 25 mg by mouth daily. , Disp: , Rfl:     lisinopril (PRINIVIL;ZESTRIL) 20 MG tablet, Take 40 mg by mouth daily , Disp: , Rfl:     rosuvastatin (CRESTOR) 5 MG tablet, Take 5 mg by mouth daily. , Disp: , Rfl:   Allergies:  Percocet [oxycodone-acetaminophen]  Social History:    reports that he has never smoked. He has never used smokeless tobacco. He reports that he does not drink alcohol or use drugs. Family History:   Family History   Problem Relation Age of Onset    High Blood Pressure Father     Heart Disease Paternal Aunt         aneursym    Heart Disease Paternal Uncle     Diabetes Paternal Uncle     Heart Disease Paternal Grandmother     Cancer Paternal Uncle 79    Emphysema Brother     Diabetes Maternal Uncle     Asthma Neg Hx     Heart Failure Neg Hx        REVIEW OF SYSTEMS: Full ROS noted & scanned   CONSTITUTIONAL: Denies unexplained weight loss, fevers, chills or fatigue  NEUROLOGICAL: Denies unsteady gait or progressive weakness      PHYSICAL EXAM:    Vitals: Height 5' 10\" (1.778 m), weight 197 lb (89.4 kg).     GENERAL EXAM: · General Apparence: Patient is adequately groomed with no evidence of malnutrition. · Orientation: The patient is oriented to time, place and person. · Mood & Affect:The patient's mood and affect are appropriate  · Lymphatic: The lymphatic examination bilaterally reveals all areas to be without enlargement or induration  · Sensation: Sensation is intact without deficit  · Coordination/Balance: Good coordination       LUMBAR/SACRAL EXAMINATION:  · Inspection: Local inspection shows no step-off or bruising. Lumbar alignment is normal.  Sagittal and Coronal balance is neutral.      · Palpation:   No evidence of tenderness at the midline. No tenderness bilaterally at the paraspinal or trochanters. There is no step-off or paraspinal spasm. · Range of Motion: Mild to moderate loss flexion and extension without pain today  · Strength:   Strength testing is 5/5 in all muscle groups tested. · Special Tests:   Straight leg raise negative today  · skin: There are no rashes, ulcerations or lesions. · Reflexes: Reflexes are symmetrically trace-1+ at the patellar and ankle tendons. Clonus absent bilaterally at the feet. · Gait & station: Normal gait  · Additional Examinations:   · RIGHT LOWER EXTREMITY: Inspection/examination of the right lower extremity does not show any tenderness, deformity or injury. Range of motion is full. There is no gross instability. There are no rashes, ulcerations or lesions. Strength and tone are normal.  ·   · LEFT LOWER EXTREMITY:  Inspection/examination of the left lower extremity does not show any tenderness, deformity or injury. Range of motion is full. There is no gross instability. There are no rashes, ulcerations or lesions.   Strength and tone are normal.    Diagnostic Testin MRI films and report reviewed showing moderate central stenosis L3-4 and mild to moderate right L5 foraminal stenosis    [address] hemoglobin A1c 5.5, normal chemistries and CBC Recent lumbar x-rays have shown spondylosis without acute fracture but are not available as of our PACS system is down        Impression:  1) 6 months right L5 radiculitis recurrent  2) Right L5 foraminal stenosis, L3-4 central stenosis      Plan:    Discussed right L5 transforaminal epidural #3 versus surgical referral    He like to schedule the third epidural      F oSnia Carr MD

## 2020-12-22 ENCOUNTER — TELEPHONE (OUTPATIENT)
Dept: ORTHOPEDIC SURGERY | Age: 70
End: 2020-12-22

## 2020-12-22 NOTE — TELEPHONE ENCOUNTER
Auth: # NPR    Date: 12/28/2020  Type of SX:  OP  Location: [facility]  CPT: 92804   DX Code: M48.062  SX area: Right L5 Transforaminal ANGELA    Insurance: Medicare

## 2020-12-28 ENCOUNTER — HOSPITAL ENCOUNTER (OUTPATIENT)
Age: 70
Setting detail: OUTPATIENT SURGERY
Discharge: HOME | End: 2020-12-28
Attending: PHYSICAL MEDICINE & REHABILITATION | Admitting: PHYSICAL MEDICINE & REHABILITATION
Payer: MEDICARE

## 2020-12-28 VITALS
HEIGHT: 70 IN | SYSTOLIC BLOOD PRESSURE: 147 MMHG | TEMPERATURE: 97.7 F | HEART RATE: 61 BPM | WEIGHT: 194 LBS | OXYGEN SATURATION: 97 % | DIASTOLIC BLOOD PRESSURE: 92 MMHG | RESPIRATION RATE: 16 BRPM | BODY MASS INDEX: 27.77 KG/M2

## 2020-12-28 PROCEDURE — 3600000012 HC SURGERY LEVEL 2 ADDTL 15MIN: Performed by: PHYSICAL MEDICINE & REHABILITATION

## 2020-12-28 PROCEDURE — 7100000010 HC PHASE II RECOVERY - FIRST 15 MIN: Performed by: PHYSICAL MEDICINE & REHABILITATION

## 2020-12-28 PROCEDURE — 3600000002 HC SURGERY LEVEL 2 BASE: Performed by: PHYSICAL MEDICINE & REHABILITATION

## 2020-12-28 PROCEDURE — 2500000003 HC RX 250 WO HCPCS: Performed by: PHYSICAL MEDICINE & REHABILITATION

## 2020-12-28 PROCEDURE — 6360000004 HC RX CONTRAST MEDICATION: Performed by: PHYSICAL MEDICINE & REHABILITATION

## 2020-12-28 PROCEDURE — 2709999900 HC NON-CHARGEABLE SUPPLY: Performed by: PHYSICAL MEDICINE & REHABILITATION

## 2020-12-28 PROCEDURE — 6360000002 HC RX W HCPCS: Performed by: PHYSICAL MEDICINE & REHABILITATION

## 2020-12-28 RX ORDER — LIDOCAINE HYDROCHLORIDE 10 MG/ML
INJECTION, SOLUTION EPIDURAL; INFILTRATION; INTRACAUDAL; PERINEURAL PRN
Status: DISCONTINUED | OUTPATIENT
Start: 2020-12-28 | End: 2020-12-28 | Stop reason: ALTCHOICE

## 2020-12-28 ASSESSMENT — PAIN DESCRIPTION - DESCRIPTORS: DESCRIPTORS: SHARP;DULL

## 2020-12-28 NOTE — OP NOTE
Patient:  Gopal Be   Medical Record #:  [id number]   Date:  12/28/2020  Physician:  Odalis Weston M.D. Facility: [facility]       Pre-op diagnosis: Lumbar radiculitis, lumbar spondylosis, lumbar foraminal stenosis  Post-op diagnosis:  same  Procedure: Right L5-S1 transforaminal epidural injection #3 with flouroscopic guidance     Procedure Note:    The patient was admitted through pre-op and written consent was obtained. The patient was advised of the risks and benefits of the procedure, including but not limited to the following: bleeding, pain, infection, temporary paralysis, nerve damage and spinal headache. The patient was given the opportunity to ask questions. There were no contraindications for this procedure. The appropriate area was prepped and draped in a sterile fashion. Landmarks were identified and marked. A 23G spinal needle was advanced to the right L5 neural foramen using fluoroscopic guidance with ideal needle tip placement confirmed by multiple views. Injection of contrast showed epidural flow. There were no signs of intravascular or intrathecal injection. 80 mg depomedrol and 1cc 1% lidocaine were then injected. There were no complications and the patient tolerated the procedure well. The patient was transferred to the recovery area and monitored. Discharge instructions were given. The patient is to contact me for any post-procedure concerns. The patient is to follow up as scheduled.     Estimated blood loss: none    F Mildred Skinner MD

## 2020-12-28 NOTE — H&P
HISTORY AND PHYSICAL/PRE-SEDATION ASSESSMENT    Patient:  Nik Almodovar   :  1950  Medical Record No.:  [id number]   Date:  2020  Physician:  Gm Still M.D. Facility: [facility]     Nursing History and Physical reviewed and agreed upon. Additional findings:    Allergies:  Percocet [oxycodone-acetaminophen]    Home Medications:    Prior to Admission medications    Medication Sig Start Date End Date Taking? Authorizing Provider   nebivolol (BYSTOLIC) 5 MG tablet TAKE 1 TABLET BY MOUTH ONE TIME A DAY  18   Historical Provider, MD   traZODone (DESYREL) 50 MG tablet TAKE 1 TABLET BY MOUTH nightly  18   Historical Provider, MD   omeprazole (PRILOSEC) 10 MG capsule Take 10 mg by mouth daily    Historical Provider, MD   hydrochlorothiazide (HYDRODIURIL) 25 MG tablet Take 25 mg by mouth daily. Historical Provider, MD   lisinopril (PRINIVIL;ZESTRIL) 20 MG tablet Take 40 mg by mouth daily  11/5/10   Historical Provider, MD   rosuvastatin (CRESTOR) 5 MG tablet Take 5 mg by mouth daily. Historical Provider, MD       Vitals: Stable     PHYSICAL EXAM:  HENT: Airway patent and reviewed  Cardiovascular: Normal rate, regular rhythm, normal heart sounds. Pulmonary/Chest: No wheezes. No rhonchi. No rales. Abdominal: Soft. Bowel sounds are normal. No distension. Mallampati: 2      MALLAMPATI:           []   I. Complete visualization of the soft palate           [x]   II. Complete visualization of the uvula            []   III. Visualization of only the base of the uvula           []   IV. Soft palate is not visible     ASA CLASS:         []   I. Normal, healthy adult           [x]   II.  Mild systemic disease            []   III. Severe systemic disease      Sedation plan:   [x]  Local              []  Minimal                  []  General anesthesia    Patient's condition acceptable for planned procedure/sedation.    Post Procedure Plan   Return to

## 2021-01-11 ENCOUNTER — OFFICE VISIT (OUTPATIENT)
Dept: ORTHOPEDIC SURGERY | Age: 71
End: 2021-01-11
Payer: MEDICARE

## 2021-01-11 VITALS — BODY MASS INDEX: 27.77 KG/M2 | HEIGHT: 70 IN | WEIGHT: 194 LBS

## 2021-01-11 DIAGNOSIS — M54.16 LUMBAR RADICULITIS: Primary | ICD-10-CM

## 2021-01-11 PROCEDURE — G8417 CALC BMI ABV UP PARAM F/U: HCPCS | Performed by: PHYSICAL MEDICINE & REHABILITATION

## 2021-01-11 PROCEDURE — 4040F PNEUMOC VAC/ADMIN/RCVD: CPT | Performed by: PHYSICAL MEDICINE & REHABILITATION

## 2021-01-11 PROCEDURE — 1123F ACP DISCUSS/DSCN MKR DOCD: CPT | Performed by: PHYSICAL MEDICINE & REHABILITATION

## 2021-01-11 PROCEDURE — G8484 FLU IMMUNIZE NO ADMIN: HCPCS | Performed by: PHYSICAL MEDICINE & REHABILITATION

## 2021-01-11 PROCEDURE — 3017F COLORECTAL CA SCREEN DOC REV: CPT | Performed by: PHYSICAL MEDICINE & REHABILITATION

## 2021-01-11 PROCEDURE — 99212 OFFICE O/P EST SF 10 MIN: CPT | Performed by: PHYSICAL MEDICINE & REHABILITATION

## 2021-01-11 PROCEDURE — 1036F TOBACCO NON-USER: CPT | Performed by: PHYSICAL MEDICINE & REHABILITATION

## 2021-01-11 PROCEDURE — G8427 DOCREV CUR MEDS BY ELIG CLIN: HCPCS | Performed by: PHYSICAL MEDICINE & REHABILITATION

## 2021-01-11 NOTE — PROGRESS NOTES
Lumbar follow-up: SPINE    CHIEF COMPLAINT:    Chief Complaint   Patient presents with    Back Pain     fu after inj   90% relief       HISTORY OF PRESENT ILLNESS:                The patient is a 78 y.o. male follow-up after right L5-S1 TX ANGELA #3 from 12/28/2020 for a 6-months h/o achy right LB/buttock pain radiating down the posterolateral leg. He has 90% relief. This is his third injection.   The postinjection forms reviewed        Pain Assessment  Location of Pain: Back  Severity of Pain: 0]     Current/Past Treatment:   · Physical Therapy: HEP  · Chiropractic:     · Injection:   9/15/2020 Right L5/S1 interlaminar epidural injection #1  10/13/2020 Right L5-S1 transforaminal epidural injection #2--85%; right L5-S1 TX ANGELA #3 from 12/28/2020   Medications:            NSAIDS:             Muscle relaxer:              Steriods:   MDP, Pred taper            Neuropathic medications:              Opioids:            Other:   · Surgery/Consult:    Past Medical History: Medical history form was reviewed today & scanned into the media tab  Past Medical History:   Diagnosis Date    Ascending aortic aneurysm ([address] 2016    Colon polyps     Hyperlipidemia     Hypertension     Sarcoidosis       Past Surgical History:     Past Surgical History:   Procedure Laterality Date    BRONCHOSCOPY      COLONOSCOPY  06/12/98    Tunular adenoma & Lymphocytic colitis    COLONOSCOPY  08/04/00    Adenoma    COLONOSCOPY  01/11/02    Hemorrhoids    COLONOSCOPY  01/28/05 12/08/07 11/09/10    No polyps    COLONOSCOPY  10/6/2015    HERNIA REPAIR Right 1990    inguinal     HIP SURGERY Right 12/28/10     [place] HIP RESURFACING W/ CELL SAVER & PLATELET GEL    PAIN MANAGEMENT PROCEDURE Right 9/15/2020    RIGHT LUMBAR FIVE SACRAL ONE EPIDURAL STEROID INJECTION SITE CONFIRMED BY FLUOROSCOPY performed by Mony Allan MD at [facility] Right 10/13/2020 RIGHT LUMBAR FIVE EPIDURAL STEROID INJECTION SITE CONFIRMED BY FLUOROSCOPY performed by Kobi Dawkins MD at [facility] Right 12/28/2020    RIGHT LUMBAR FIVE TRANSFORAMINAL EPIDURAL STEROID INJECTION SITE CONFIRMED BY FLUOROSCOPY performed by Kobi Dawkins MD at [address]  01/114/02 12/18/07     Current Medications:     Current Outpatient Medications:     nebivolol (BYSTOLIC) 5 MG tablet, TAKE 1 TABLET BY MOUTH ONE TIME A DAY , Disp: , Rfl:     traZODone (DESYREL) 50 MG tablet, TAKE 1 TABLET BY MOUTH nightly , Disp: , Rfl:     omeprazole (PRILOSEC) 10 MG capsule, Take 10 mg by mouth daily, Disp: , Rfl:     hydrochlorothiazide (HYDRODIURIL) 25 MG tablet, Take 25 mg by mouth daily. , Disp: , Rfl:     lisinopril (PRINIVIL;ZESTRIL) 20 MG tablet, Take 40 mg by mouth daily , Disp: , Rfl:     rosuvastatin (CRESTOR) 5 MG tablet, Take 5 mg by mouth daily. , Disp: , Rfl:   Allergies: Iv dye [iodides] and Percocet [oxycodone-acetaminophen]  Social History:    reports that he has never smoked. He has never used smokeless tobacco. He reports that he does not drink alcohol or use drugs. Family History:   Family History   Problem Relation Age of Onset    High Blood Pressure Father     Heart Disease Paternal Aunt         aneursym    Heart Disease Paternal Uncle     Diabetes Paternal Uncle     Heart Disease Paternal Grandmother     Cancer Paternal Uncle 79    Emphysema Brother     Diabetes Maternal Uncle     Asthma Neg Hx     Heart Failure Neg Hx        REVIEW OF SYSTEMS: Full ROS noted & scanned   CONSTITUTIONAL: Denies unexplained weight loss, fevers, chills or fatigue  NEUROLOGICAL: Denies unsteady gait or progressive weakness      PHYSICAL EXAM:    Vitals: Height 5' 10\" (1.778 m), weight 194 lb (88 kg). GENERAL EXAM:  · General Apparence: Patient is adequately groomed with no evidence of malnutrition. · Orientation: The patient is oriented to time, place and person. · Mood & Affect:The patient's mood and affect are appropriate  · Lymphatic: The lymphatic examination bilaterally reveals all areas to be without enlargement or induration  · Sensation: Sensation is intact without deficit  · Coordination/Balance: Good coordination       LUMBAR/SACRAL EXAMINATION:  · Inspection: Local inspection shows no step-off or bruising. Lumbar alignment is normal.  Sagittal and Coronal balance is neutral.      · Palpation:   No evidence of tenderness at the midline. No tenderness bilaterally at the paraspinal or trochanters. There is no step-off or paraspinal spasm. · Range of Motion: Mild to moderate loss flexion and extension without pain today  · Strength:   Strength testing is 5/5 in all muscle groups tested. · Special Tests:   Straight leg raise negative today  · skin: There are no rashes, ulcerations or lesions. · Reflexes: Reflexes are symmetrically trace-1+ at the patellar and ankle tendons. Clonus absent bilaterally at the feet. · Gait & station: Normal gait  · Additional Examinations:   · RIGHT LOWER EXTREMITY: Inspection/examination of the right lower extremity does not show any tenderness, deformity or injury. Range of motion is full. There is no gross instability. There are no rashes, ulcerations or lesions. Strength and tone are normal.  ·   · LEFT LOWER EXTREMITY:  Inspection/examination of the left lower extremity does not show any tenderness, deformity or injury. Range of motion is full. There is no gross instability. There are no rashes, ulcerations or lesions.   Strength and tone are normal.    Diagnostic Testin MRI films and report reviewed showing moderate central stenosis L3-4 and mild to moderate right L5 foraminal stenosis    [address] hemoglobin A1c 5.5, normal chemistries and CBC Recent lumbar x-rays have shown spondylosis without acute fracture but are not available as of our PACS system is down        Impression:  1) 6 months right L5 radiculitis recurrent-improved  2) Right L5 foraminal stenosis, L3-4 central stenosis      Plan:    Rosa exercisesHEP    If symptoms return soon can call we can consider low-dose tramadol which is helped in the past    Wise he may consider seeing Dr. Vonnie Arechiga in consult    Angela Hernández MD

## 2022-08-24 ENCOUNTER — HOSPITAL ENCOUNTER (EMERGENCY)
Age: 72
Discharge: HOME | End: 2022-08-25
Payer: MEDICARE

## 2022-08-24 VITALS
HEIGHT: 70 IN | OXYGEN SATURATION: 95 % | HEART RATE: 81 BPM | RESPIRATION RATE: 16 BRPM | BODY MASS INDEX: 27.06 KG/M2 | DIASTOLIC BLOOD PRESSURE: 80 MMHG | TEMPERATURE: 98.2 F | WEIGHT: 189 LBS | SYSTOLIC BLOOD PRESSURE: 116 MMHG

## 2022-08-24 DIAGNOSIS — S61.011A LACERATION OF RIGHT THUMB WITHOUT FOREIGN BODY WITHOUT DAMAGE TO NAIL, INITIAL ENCOUNTER: Primary | ICD-10-CM

## 2022-08-24 PROCEDURE — 90471 IMMUNIZATION ADMIN: CPT | Performed by: PHYSICIAN ASSISTANT

## 2022-08-24 PROCEDURE — 99284 EMERGENCY DEPT VISIT MOD MDM: CPT

## 2022-08-24 PROCEDURE — 6360000002 HC RX W HCPCS: Performed by: PHYSICIAN ASSISTANT

## 2022-08-24 PROCEDURE — 90715 TDAP VACCINE 7 YRS/> IM: CPT | Performed by: PHYSICIAN ASSISTANT

## 2022-08-24 RX ADMIN — TETANUS TOXOID, REDUCED DIPHTHERIA TOXOID AND ACELLULAR PERTUSSIS VACCINE, ADSORBED 0.5 ML: 5; 2.5; 8; 8; 2.5 SUSPENSION INTRAMUSCULAR at 23:53

## 2022-08-24 ASSESSMENT — PAIN SCALES - GENERAL: PAINLEVEL_OUTOF10: 2

## 2022-08-24 ASSESSMENT — PAIN DESCRIPTION - LOCATION: LOCATION: HAND

## 2022-08-24 ASSESSMENT — PAIN DESCRIPTION - ORIENTATION: ORIENTATION: LEFT

## 2022-08-24 ASSESSMENT — PAIN - FUNCTIONAL ASSESSMENT: PAIN_FUNCTIONAL_ASSESSMENT: 0-10

## 2022-08-25 NOTE — ED NOTES
Pt left thumb laceration cleansed with Hibiclens/Normal Saline applied with Surgifoam/gauze/kerlex/coban pressure dressing/pt elevating hand above heart.  Will reassess for continued bleeding in 15 min     Levon Anderson LPN  98/68/14 [id number]

## 2022-08-25 NOTE — ED PROVIDER NOTES
PROCEDURE Right 9/15/2020    RIGHT LUMBAR FIVE SACRAL ONE EPIDURAL STEROID INJECTION SITE CONFIRMED BY FLUOROSCOPY performed by Ranulfo Coronado MD at [facility] [employer] Right 10/13/2020    RIGHT LUMBAR FIVE EPIDURAL STEROID INJECTION SITE CONFIRMED BY FLUOROSCOPY performed by Ranulfo Coronado MD at [facility] [employer] [facility] Right 12/28/2020    RIGHT LUMBAR FIVE TRANSFORAMINAL EPIDURAL STEROID INJECTION SITE CONFIRMED BY FLUOROSCOPY performed by Ranulfo Coronado MD at [place] [address]  01/114/02 12/18/07     Family History   Problem Relation Age of Onset    High Blood Pressure Father     Heart Disease Paternal Aunt         aneursym    Heart Disease Paternal Uncle     Diabetes Paternal Uncle     Heart Disease Paternal Grandmother     Cancer Paternal Uncle 79    Emphysema Brother     Diabetes Maternal Uncle     Asthma Neg Hx     Heart Failure Neg Hx      Social History     Socioeconomic History    Marital status:      Spouse name: Not on file    Number of children: Not on file    Years of education: Not on file    Highest education level: Not on file   Occupational History    Not on file   Tobacco Use    Smoking status: Never    Smokeless tobacco: Never   Substance and Sexual Activity    Alcohol use: No     Alcohol/week: 0.0 standard drinks    Drug use: No    Sexual activity: Not on file   Other Topics Concern    Not on file   Social History Narrative    Not on file     Social Determinants of Health     Financial Resource Strain: Not on file   Food Insecurity: Not on file   Transportation Needs: Not on file   Physical Activity: Not on file   Stress: Not on file   Social Connections: Not on file   Intimate Partner Violence: Not on file   Housing Stability: Not on file     Current Facility-Administered Medications   Medication Dose Route Frequency Provider Last Rate Last Admin    tetanus-diphth-acell pertussis (BOOSTRIX) injection 0.5 mL  0.5 mL IntraMUSCular Once Tamera Olivares PA-C         Current Outpatient Medications   Medication Sig Dispense Refill    nebivolol (BYSTOLIC) 5 MG tablet TAKE 1 TABLET BY MOUTH ONE TIME A DAY       traZODone (DESYREL) 50 MG tablet TAKE 1 TABLET BY MOUTH nightly       omeprazole (PRILOSEC) 10 MG capsule Take 10 mg by mouth daily      hydrochlorothiazide (HYDRODIURIL) 25 MG tablet Take 25 mg by mouth daily. lisinopril (PRINIVIL;ZESTRIL) 20 MG tablet Take 40 mg by mouth daily       rosuvastatin (CRESTOR) 5 MG tablet Take 5 mg by mouth daily. Allergies   Allergen Reactions    Iv Dye [Iodides] Nausea And Vomiting    Percocet [Oxycodone-Acetaminophen] Nausea And Vomiting       REVIEW OF SYSTEMS  10 systems reviewed, pertinent positives per HPI otherwise noted to be negative    PHYSICAL EXAM  /80   Pulse 81   Temp 98.2 °F (36.8 °C) (Oral)   Resp 16   Ht 5' 10\" (1.778 m)   Wt 189 lb (85.7 kg)   SpO2 95%   BMI 27.12 kg/m²   GENERAL APPEARANCE: Awake and alert. Cooperative. No acute distress. Nontoxic in appearance  HEAD: Normocephalic. Atraumatic. No ahas signs or raccoon eyes. EYES: EOM's grossly intact. No conjunctival injection or discharge. ENT: Mucous membranes are pink and moist.   NECK: Supple. Full range of motion. No nuchal rigidity. No tracheal tenderness or deviation. No stridor. HEART: RRR. No murmurs, rubs or gallops. Normal S1-S2. No S3 or S4.  LUNGS: Respirations unlabored. CTAB. Good air exchange. Speaking comfortably in full sentences. ABDOMEN: Soft. Non-distended. Non-tender. No guarding or rebound. No masses. No organomegaly. EXTREMITIES: A 2 mm laceration located on the pulp of the right thumb. Laceration is oozing dark red blood. There is no pulsatile blood noted. Full range of motion of the thumb with slight tenderness to palpation along the laceration. No edema, erythema, or ecchymosis noted.   No crepitus or palpable deformities. No peripheral edema. Moves all extremities equally. All extremities neurovascularly intact. SKIN: Warm and dry. No acute rashes. NEUROLOGICAL: Alert and oriented. CN's 2-12 intact. No gross facial drooping. Strength 5/5, sensation intact. PSYCHIATRIC: Normal mood and affect. RADIOLOGY  No results found. ED COURSE  43-year-old male presents to the ED for evaluation of laceration to the right hand that he suffered while trying to open a biscuit and the knife slipped in his hand. He has a 2 mm laceration noted to the pulp of the thumb on the right hand. Full range of motion of the finger. There are no palpable deformities. Neurovascular status is still intact. Wound was cleaned and Surgifoam was applied to the wound. Informed patient that a couple days dressing should come off. Provided patient with strict return protocol to include reporting to the emergency department should he develop fever body aches or chills, or severe tenderness palpation to the area with purulent discharge. Triage vitals /80, pulse of 81, respirations 16, temperature 98.2 °F, SPO2 95% on room air. Vital signs remained stable during course of ED stay. Risk management discussed and shared decision making had with patient and/or surrogate. All questions were answered. Patient will follow up with her care provider for further evaluation/treatment. All questions answered. Patient will return to ED for new/worsening symptoms. CRITICAL CARE TIME  0 minutes of critical care time spent not including separately billable procedures. MDM  No results found for this visit on 08/24/22. I estimate there is LOW risk for COMPARTMENT SYNDROME, DEEP VENOUS THROMBOSIS, SEPTIC ARTHRITIS, TENDON OR NEUROVASCULAR INJURY, thus I consider the discharge disposition reasonable.  Richard Begum and I have discussed the diagnosis and risks, and we agree with discharging home to follow-up with their primary doctor or the

## 2023-08-31 NOTE — PROGRESS NOTES
Lumbar follow-up: SPINE    CHIEF COMPLAINT:    Chief Complaint   Patient presents with    Back Pain     F/U ANGELA injection       HISTORY OF PRESENT ILLNESS:                The patient is a 78 y.o. male follow-up after right L5-S1 TX ANGELA #2 from 10/13/2020 for a 6-months h/o achy right LB/buttock pain radiating down the posterolateral leg. He initially experienced tingling in his feet bilaterally which is since resolved. Pain initially increased with prolonged sitting. Relief with changing position. He currently reports 85% improvement with improved function. Other conservative care includes PT/HEP, oral steroids. At this time denies any progressive numbness or weakness. No side effects the procedure.     Injection forms reviewed     Pain Assessment  Location of Pain: Back  Severity of Pain: 2  Quality of Pain: Sharp, Dull, Aching  Duration of Pain: Persistent  Frequency of Pain: Constant  Aggravating Factors: Stairs, Standing, Squatting, Walking, Kneeling, Exercise, Straightening, Bending, Stretching  Limiting Behavior: Yes  Relieving Factors: Rest  Result of Injury: No  Work-Related Injury: No  Are there other pain locations you wish to document?: No]     Current/Past Treatment:   · Physical Therapy: HEP  · Chiropractic:     · Injection:   9/15/2020 Right L5/S1 interlaminar epidural injection #1  10/13/2020 Right L5-S1 transforaminal epidural injection #2--85%  Medications:            NSAIDS:             Muscle relaxer:              Steriods:   MDP, Pred taper            Neuropathic medications:              Opioids:            Other:   · Surgery/Consult:    Past Medical History: Medical history form was reviewed today & scanned into the media tab  Past Medical History:   Diagnosis Date    [address] [address] 2016    Colon polyps     Hyperlipidemia     Hypertension     Sarcoidosis       Past Surgical History:     Past Surgical History:   Procedure Laterality Date    BRONCHOSCOPY      COLONOSCOPY  06/12/98    Tunular adenoma & Lymphocytic colitis    COLONOSCOPY  08/04/00    Adenoma    COLONOSCOPY  01/11/02    Hemorrhoids    COLONOSCOPY  01/28/05 12/08/07 11/09/10    No polyps    COLONOSCOPY  10/6/2015    HERNIA REPAIR Right 1990    inguinal     HIP SURGERY Right 12/28/10     [place] HIP RESURFACING W/ CELL SAVER & PLATELET GEL    PAIN MANAGEMENT PROCEDURE Right 9/15/2020    RIGHT LUMBAR FIVE SACRAL ONE EPIDURAL STEROID INJECTION SITE CONFIRMED BY FLUOROSCOPY performed by Fannie Morrissey MD at [address] Right 10/13/2020    RIGHT LUMBAR FIVE EPIDURAL STEROID INJECTION SITE CONFIRMED BY FLUOROSCOPY performed by Fannie Morrissey MD at [address]  01/114/02 12/18/07     Current Medications:     Current Outpatient Medications:     nebivolol (BYSTOLIC) 5 MG tablet, TAKE 1 TABLET BY MOUTH ONE TIME A DAY , Disp: , Rfl:     traZODone (DESYREL) 50 MG tablet, TAKE 1 TABLET BY MOUTH nightly , Disp: , Rfl:     omeprazole (PRILOSEC) 10 MG capsule, Take 10 mg by mouth daily, Disp: , Rfl:     hydrochlorothiazide (HYDRODIURIL) 25 MG tablet, Take 25 mg by mouth daily. , Disp: , Rfl:     lisinopril (PRINIVIL;ZESTRIL) 20 MG tablet, Take 40 mg by mouth daily , Disp: , Rfl:     rosuvastatin (CRESTOR) 5 MG tablet, Take 5 mg by mouth daily. , Disp: , Rfl:   Allergies:  Percocet [oxycodone-acetaminophen]  Social History:    reports that he has never smoked. He has never used smokeless tobacco. He reports that he does not drink alcohol or use drugs.   Family History:   Family History   Problem Relation Age of Onset    High Blood Pressure Father     Heart Disease Paternal Aunt         aneursym    Heart Disease Paternal Uncle     Diabetes Paternal Uncle     Heart Disease Paternal Grandmother     Cancer Paternal Uncle 79    Emphysema Brother     Diabetes Maternal Uncle     Asthma Neg Hx     Heart Failure Neg Hx REVIEW OF SYSTEMS: Full ROS noted & scanned   CONSTITUTIONAL: Denies unexplained weight loss, fevers, chills or fatigue  NEUROLOGICAL: Denies unsteady gait or progressive weakness      PHYSICAL EXAM:    Vitals: Height 5' 10\" (1.778 m), weight 197 lb 1.5 oz (89.4 kg). GENERAL EXAM:  · General Apparence: Patient is adequately groomed with no evidence of malnutrition. · Orientation: The patient is oriented to time, place and person. · Mood & Affect:The patient's mood and affect are appropriate  · Lymphatic: The lymphatic examination bilaterally reveals all areas to be without enlargement or induration  · Sensation: Sensation is intact without deficit  · Coordination/Balance: Good coordination       LUMBAR/SACRAL EXAMINATION:  · Inspection: Local inspection shows no step-off or bruising. Lumbar alignment is normal.  Sagittal and Coronal balance is neutral.      · Palpation:   No evidence of tenderness at the midline. No tenderness bilaterally at the paraspinal or trochanters. There is no step-off or paraspinal spasm. · Range of Motion: Mild to moderate loss flexion and extension without pain today  · Strength:   Strength testing is 5/5 in all muscle groups tested. · Special Tests:   Straight leg raise negative today  · skin: There are no rashes, ulcerations or lesions. · Reflexes: Reflexes are symmetrically trace-1+ at the patellar and ankle tendons. Clonus absent bilaterally at the feet. · Gait & station: Normal gait  · Additional Examinations:   · RIGHT LOWER EXTREMITY: Inspection/examination of the right lower extremity does not show any tenderness, deformity or injury. Range of motion is full. There is no gross instability. There are no rashes, ulcerations or lesions. Strength and tone are normal.  ·   · LEFT LOWER EXTREMITY:  Inspection/examination of the left lower extremity does not show any tenderness, deformity or injury. Range of motion is full. There is no gross instability.  There are no rashes, ulcerations or lesions.   Strength and tone are normal.    Diagnostic Testin MRI films and report reviewed showing moderate central stenosis L3-4 and mild to moderate right L5 foraminal stenosis    [address] hemoglobin A1c 5.5, normal chemistries and CBC    Recent lumbar x-rays have shown spondylosis without acute fracture but are not available as of our PACS system is down        Impression:  1) 6 months right L5 radiculitis, improved  2) Right L5 foraminal stenosis, L3-4 central stenosis      Plan:  1) Cont HEP  2) Discussed repeat Right L5 transforaminal epidural #3 if needed        [facility] Xolair Counseling:  Patient informed of potential adverse effects including but not limited to fever, muscle aches, rash and allergic reactions.  The patient verbalized understanding of the proper use and possible adverse effects of Xolair.  All of the patient's questions and concerns were addressed.

## (undated) DEVICE — GAUZE,SPONGE,4"X4",16PLY,STRL,LF,10/TRAY: Brand: MEDLINE

## (undated) DEVICE — TOWEL OR BLUEE 16X26IN ST 8 PACK ORB08 16X26ORTWL

## (undated) DEVICE — CHLORAPREP 26ML ORANGE

## (undated) DEVICE — STERILE POLYISOPRENE POWDER-FREE SURGICAL GLOVES: Brand: PROTEXIS

## (undated) DEVICE — UNIVERSAL BLOCK TRAY: Brand: MEDLINE INDUSTRIES, INC.

## (undated) DEVICE — ALCOHOL RUBBING 16OZ 70% ISO